# Patient Record
Sex: MALE | Race: BLACK OR AFRICAN AMERICAN
[De-identification: names, ages, dates, MRNs, and addresses within clinical notes are randomized per-mention and may not be internally consistent; named-entity substitution may affect disease eponyms.]

---

## 2017-09-22 NOTE — HP
COWS





- Scale


Resting Pulse: 0= OR 80 or Below


Sweatin=Flushed/Facial Moisture


Restless Observation: 1= Difficult to Sit Still


Pupil Size: 0= Normal to Room Light


Bone or Joint Aches: 2= Severe Diffuse Aches


Runny Nose/ Eye Tearin= Runny Nose/Eyes


GI Upset > 30mins: 0= None


Tremor Observation: 2= Slight Tremor Visible


Yawning Observation: 2= >3x During Session


Anxiety or Irritability: 2=Irritable/Anxious


Goose Flesh Skin: 3=Piloerection


COWS Score: 16





CIWA Score





- CIWA Score


Nausea/Vomitin-No Nausea/No Vomiting


Muscle Tremors: 4-Moderate,w/Arms Extend


Anxiety: 3


Agitation: 4-Moderately Restless


Paroxysmal Sweats: 3


Orientation: 0-Oriented


Tacttile Disturbances: 0-None


Auditory Disturbances: 0-None


Visual Disturbances: 0-None


Headache: 0-None Present


CIWA-Ar Total Score: 14





Admission ROS [facility]





- HPI


Chief Complaint: 


I need to get my life back. I want to go to rehab to continue my treatment. 


Allergies/Adverse Reactions: 


 Allergies











Allergy/AdvReac Type Severity Reaction Status Date / Time


 


No Known Allergies Allergy   Verified 17 15:07











History of Present Illness: 


pt is a 57yr old male with a history of alcohol and heroin dependence seeking 

detox for treatment. 


Exam Limitations: No Limitations





- Ebola screening


Have you traveled outside of the country in the last 21 days: No


Have you had contact with anyone from an Ebola affected area: No


Have you been sick,other than usual withdrawal symptoms: No


Do you have a fever: No





- Review of Systems


Constitutional: Chills, Diaphoresis, Loss of Appetite, Night Sweats, Changes in 

sleep


EENT: reports: No Symptoms Reported


Respiratory: reports: Cough


Cardiac: reports: No Symptoms Reported


GI: reports: Constipated, Poor Fluid Intake, Indigestion


: reports: No Symptoms Reported


Musculoskeletal: reports: Back Pain, Other (feet pain)


Integumentary: reports: Flushing, Sweating


Neuro: reports: Tingling, Tremors


Endocrine: reports: Excessive Sweating, Flushing, Intolerance to Cold, 

Intolerance to Heat


Hematology: reports: No Symptoms Reported


Psychiatric: reports: Judgement Intact, Mood/Affect Appropiate, Orientated x3, 

Agitated, Anxious


Other Systems: Reviewed and Negative





Patient History





- Patient Medical History


Hx Anemia: No


Hx Asthma: No


Hx Chronic Obstructive Pulmonary Disease (COPD): No


Hx Cancer: No


Hx Cardiac Disorders: No


Hx Congestive Heart Failure: No


Hx Hypertension: No


Hx Hypercholesterolemia: No


Hx Pacemaker: No


HX Cerebrovascular Accident: No


Hx Seizures: No


Hx Dementia: No


Hx Diabetes: No


Hx Gastrointestinal Disorders: No


Hx Liver Disease: No


Hx Genitourinary Disorders: No


Hx Sexually Transmitted Disorders: No


Hx Renal Disease (ESRD): No


Hx Thyroid Disease: No


Hx Human Immunodeficiency Virus (HIV): No (negative)


Hx Hepatitis C: Yes (since )


Hx Depression: Yes


Hx Suicide Attempt: No (denies)


Hx Bipolar Disorder: No


Hx Schizophrenia: No





- Patient Surgical History


Past Surgical History: Yes


Hx Neurologic Surgery: No


Hx Cataract Extraction: No


Hx Cardiac Surgery: No


Hx Lung Surgery: No


Hx Breast Surgery: No


Hx Breast Biopsy: No


Hx Abdominal Surgery: Yes (right inguinal hernia in )


Hx Appendectomy: No


Hx Cholecystectomy: No


Hx Genitourinary Surgery: No


Hx  Section: No


Hx Orthopedic Surgery: No


Other Surgical History: right inguinal hernia repaqir in 


Anesthesia Reaction: No





- PPD History


Previous Implant?: Yes


Documented Results: Negative w/o proof


PPD to be Administered?: Yes





- Reproductive History


Patient is a Female of Child Bearing Age (11 -55 yrs old): No





- Smoking Cessation


Smoking history: Current every day smoker


Have you smoked in the past 12 months: Yes


Aproximately how many cigarettes per day: 20


Hx Chewing Tobacco Use: No


Initiated information on smoking cessation: Yes


'Breaking Loose' booklet given: 17





- Substance & Tx. History


Hx Alcohol Use: Yes


Hx Substance Use: Yes


Substance Use Type: Alcohol, Heroin


Hx Substance Use Treatment: Yes (last detox at [facility] 2 weeks ago and signed 

out. )





- Substances Abused


  ** Alcohol


Route: Oral


Frequency: Daily


Amount used: vodka(2 pints)/beer-4-40oz


Age of first use: 10


Date of Last Use: 17





  ** Heroin


Route: Inhalation


Frequency: Daily


Amount used: 5-6 bags


Age of first use: 17


Date of Last Use: 17





  ** Cocaine


Route: Smoking


Frequency: 1-3 times last 30 days


Amount used: $10


Age of first use: 14


Date of Last Use: 17





  ** Marijuana/Hashish


Route: Smoking


Frequency: Daily


Amount used: $10


Age of first use: 8


Date of Last Use: 15





Family Disease History





- Family Disease History


Family History: Denies





Admission Physical Exam [facility]





- Vital Signs


Vital Signs: 


 Vital Signs - 24 hr











  17





  11:51


 


Temperature 97.3 F L


 


Pulse Rate 61


 


Respiratory 18





Rate 


 


Blood Pressure 125/62














- Physical


General Appearance: Yes: Appropriately Dressed, Moderate Distress, Tremorous, 

Irritable, Sweating, Anxious


HEENTM: Yes: Hearing grossly Normal, Normal Voice


Respiratory: Yes: Lungs Clear, Normal Breath Sounds, No Respiratory Distress


Neck: Yes: No masses,lesions,Nodules


Breast: Yes: Within Normal Limits


Cardiology: Yes: Regular Rhythm, Regular Rate, S1, S2


Abdominal: Yes: Normal Bowel Sounds, Non Tender, Soft


Genitourinary: Yes: Within Normal Limits


Back: Yes: Within Normal Limits


Musculoskeletal: Yes: full range of Motion, Back pain


Extremities: Yes: Normal Capillary Refill, Normal Inspection, Tremors


Neurological: Yes: Fully Oriented, Alert, Normal Response


Integumentary: Yes: Normal Color, Diaphoresis


Lymphatic: Yes: Within Normal Limits





- Diagnostic


(1) Alcohol dependence with uncomplicated withdrawal


Current Visit: Yes   Status: Chronic





(2) Opioid dependence with withdrawal


Current Visit: Yes   Status: Chronic





(3) Hepatitis C carrier


Current Visit: No   Status: Chronic





(4) Tinea pedis of both feet


Current Visit: Yes   Status: Chronic








Cleared for Admission [facility]





- Detox or Rehab


[facility] Level of Care: Medically Managed


Detox Regimen/Protocol: Methadone/Librium





[facility] Breath Alcohol Content


Breath Alcohol Content: 0





Urine Drug Screen





- Results


Drug Screen Negative: No


Urine Drug Screen Results: THC-Marijuana, JULIANNA-Cocaine, OPI-Opiates, PCP-

Phencyclidine, BZO-Benzodiazepines, MTD-Methadone

## 2017-09-23 NOTE — EKG
Test Reason : 

Blood Pressure : ***/*** mmHG

Vent. Rate : 056 BPM     Atrial Rate : 056 BPM

   P-R Int : 170 ms          QRS Dur : 088 ms

    QT Int : 436 ms       P-R-T Axes : 078 -37 022 degrees

   QTc Int : 420 ms

 

SINUS BRADYCARDIA

LEFT AXIS DEVIATION

ABNORMAL ECG

NO PREVIOUS ECGS AVAILABLE

Confirmed by MD JAVIER, SLICK ([id number]) on 9/23/2017 9:50:05 AM

 

Referred By:             Confirmed By:SLICK HERRERA MD

## 2017-09-23 NOTE — CONSULT
[facility] Psychiatric Consult





- Data


Date of interview: 09/23/17


Admission source: Self-referred


Identifying data: Mr Rosenbaum is a 57 years old single Black, father of 3 childre

, unemployed with no source of income, homeless seeking detox treatment for 

alcohol, heroin, cocaine and marijuana


Substance Abuse History: Reports history of alcohol, heroin, cocaine and 

marijuana use. He started  smoking marijuana at age 8, drinking at age 10, 

smoking crack cocaine at 14 and using heroin at 17. Consumes 2 pints of vodka & 

4x 40ox of beer, 5-6 bags of heroin and $10 worth of marijuana daily. reports 

smoking $10 worth of Crack cocaine 1-3 times in the last 30 days


Medical History: Significant for Hep C diagnosed in 1993 and history of surgery 

for right inguina hernia repair. Smokes cigarettes 1ppd


Psychiatric History: Denies history of previous psychiatric treatment


Physical/Sexual Abuse/Trauma History: Denies


Additional Comment: Reports history of multiple arrests including multiple 

felony convictions. Reports being on parole till 2019





Mental Status Exam





- Mental Status Exam


Alert and Oriented to: Time, Place, Person


Cognitive Function: Fair


Patient Appearance: Well Groomed


Mood: Depressed, Anxious


Affect: Appropriate


Patient Behavior: Cooperative


Speech Pattern: Clear


Voice Loudness: Normal


Thought Process: Intact


Thought Disorder: Not Present


Hallucinations: Denies


Suicidal Ideation: Denies


Homicidal Ideation: Denies


Insight/Judgement: Poor


Sleep: Poorly


Appetite: Poor


Muscle strength/Tone: Normal


Gait/Station: Normal





Psychiatric Findings





- Problem List (Axis 1, 2,3)


(1) Substance induced mood disorder


Current Visit: Yes   Status: Acute





(2) Substance-induced sleep disorder


Current Visit: Yes   Status: Acute





(3) Alcohol dependence with uncomplicated withdrawal


Current Visit: Yes   Status: Chronic





(4) Opioid dependence with withdrawal


Current Visit: Yes   Status: Chronic





(5) Cannabis dependence


Current Visit: Yes   Status: Acute





(6) Nicotine dependence


Current Visit: Yes   Status: Acute   





(7) Tinea pedis of both feet


Current Visit: Yes   Status: Chronic





(8) s/p right inguinal hernia repair


Current Visit: No   Status: Active





(9) tinea corporis of right hand


Current Visit: No   Status: Active





(10) Hepatitis C carrier


Current Visit: No   Status: Chronic








- Initial Treatment Plan


Initial Treatment Plan: 1) Start Ambien 10 mg po HS prn for insomnia. Benefits 

vs Risks of medication discussed with patient and he agreed to try it.  2) 

Continue inpatient detoxfication

## 2017-09-23 NOTE — DS
[facility] Detox Discharge Summary


Admission Date: 


09/22/17





Discharge Date: 09/23/17





- History


Present History: Alcohol Dependence, Cannabis Dependence, Opioid Dependence


Additional Comments: 


PATIENT DOES NOT WISH TO STAY TO COMPLETE DETOX REGIMEN. PATIENT ADVISED TO GO 

IMMEDIATELY TO NEAREST ER SHOULD ANY INTOLERABLE DETOX SYMPTOMS DEVELOP AT ANY 

TIME. PATIENT LEFT DETOX UNIT IN STABLE MEDICAL CONDITION.


Pertinent Past History: 


Hep C, History of Inguinal Hernia Repair, Tinea Pedis of Bilateral Feet.





- Physical Exam Results


Vital Signs: 


 Vital Signs











Temperature  96.7 F L  09/23/17 09:42


 


Pulse Rate  56 L  09/23/17 09:42


 


Respiratory Rate  18   09/23/17 09:42


 


Blood Pressure  125/86   09/23/17 09:42


 


O2 Sat by Pulse Oximetry (%)      











Pertinent Admission Physical Exam Findings: 


WITHDRAWAL SYMPTOMS.





 Laboratory Tests











  09/22/17 09/22/17 09/22/17





  15:00 15:00 15:00


 


WBC  6.0  


 


RBC  4.96  


 


Hgb  13.9  D  


 


Hct  42.3  


 


MCV  85.3  


 


MCH  28.1  


 


MCHC  32.9  


 


RDW  14.7  


 


Plt Count  272  D  


 


MPV  10.1  


 


Platelet Comment  No clumping noted  


 


Sodium   140 


 


Potassium   4.5 


 


Chloride   105 


 


Carbon Dioxide   26 


 


Anion Gap   9 


 


BUN   18  D 


 


Creatinine   1.0 


 


Creat Clearance w eGFR   > 60 


 


POC Glucometer   


 


Random Glucose   80 


 


Calcium   9.3 


 


Total Bilirubin   0.7 


 


AST   76 H D 


 


ALT   92 H 


 


Alkaline Phosphatase   50  D 


 


Total Protein   7.2 


 


Albumin   3.7 


 


Urine Color   


 


Urine Appearance   


 


Urine pH   


 


Ur Specific Gravity   


 


Urine Protein   


 


Urine Glucose (UA)   


 


Urine Ketones   


 


Urine Blood   


 


Urine Nitrite   


 


Urine Bilirubin   


 


Urine Urobilinogen   


 


Urine RBC   


 


Urine WBC   


 


Hyaline Casts   


 


Urine Mucus   


 


RPR Titer    Nonreactive














  09/22/17 09/22/17 09/23/17





  15:25 21:46 06:06


 


WBC   


 


RBC   


 


Hgb   


 


Hct   


 


MCV   


 


MCH   


 


MCHC   


 


RDW   


 


Plt Count   


 


MPV   


 


Platelet Comment   


 


Sodium   


 


Potassium   


 


Chloride   


 


Carbon Dioxide   


 


Anion Gap   


 


BUN   


 


Creatinine   


 


Creat Clearance w eGFR   


 


POC Glucometer  163   101


 


Random Glucose   


 


Calcium   


 


Total Bilirubin   


 


AST   


 


ALT   


 


Alkaline Phosphatase   


 


Total Protein   


 


Albumin   


 


Urine Color   Lt. yellow 


 


Urine Appearance   Clear 


 


Urine pH   5.0 


 


Ur Specific Gravity   1.025 


 


Urine Protein   Negative 


 


Urine Glucose (UA)   Negative 


 


Urine Ketones   Negative 


 


Urine Blood   1+ H 


 


Urine Nitrite   Negative 


 


Urine Bilirubin   Negative 


 


Urine Urobilinogen   0.2 


 


Urine RBC   <1 


 


Urine WBC   1 


 


Hyaline Casts   22 


 


Urine Mucus   Rare 


 


RPR Titer   








LABS NOTED.





- Treatment


Hospital Course: Detoxed Safely





- Medication


Discharge Medications: 


Ambulatory Orders





Metformin HCl [Glucophage -] 500 mg PO DAILY 09/22/17 











- Diagnosis


(1) Cannabis dependence


Status: Acute





(2) Nicotine dependence


Status: Chronic   Qualifiers: 


     Nicotine product type: cigarettes     Substance use status: uncomplicated 

       Qualified Code(s): F17.210 - Nicotine dependence, cigarettes, 

uncomplicated  





(3) Substance induced mood disorder


Status: Acute





(4) Substance-induced sleep disorder


Status: Acute





(5) Opioid dependence with withdrawal


Status: Acute





(6) Tinea pedis of both feet


Status: Chronic





(7) s/p right inguinal hernia repair


Status: Chronic





(8) tinea corporis of right hand


Status: Active





(9) Hepatitis C carrier


Status: Chronic





(10) Alcohol dependence with uncomplicated withdrawal


Status: Acute








- AMA


Did Patient Leave Against Medical Advice: Yes (PATIENT DID NOT WEISH TO STAY TO 

COMPLETE DETOX REGIMEN.)

## 2018-09-08 NOTE — HP
COWS





- Scale


Resting Pulse: 0= AL 80 or Below


Sweatin= Chills/Flushing


Restless Observation: 1= Difficult to Sit Still


Pupil Size: 0= Normal to Room Light


Bone or Joint Aches: 1= Mild Discomfort


Runny Nose/ Eye Tearin= Nasal Congestion


GI Upset > 30mins: 2= Nausea/Diarrhea


Tremor Observation: 2= Slight Tremor Visible


Yawning Observation: 1= 1-2x During Session


Anxiety or Irritability: 1=Feels Anxious/Irritable


Goose Flesh Skin: 0=Smooth Skin


COWS Score: 10





CIWA Score





- CIWA Score


Nausea/Vomitin-Mild Nausea/No Vomiting


Muscle Tremors: 3


Anxiety: 4-Mod. Anxious/Guarded


Agitation: 1-Slight > Activity


Paroxysmal Sweats: No Perspiration


Orientation: 1-Uncertain about Date


Tacttile Disturbances: 0-None


Auditory Disturbances: 1-Very Mild


Visual Disturbances: 1-Very Mild Sensitivity


Headache: 2-Mild


CIWA-Ar Total Score: 14





Admission ROS [facility]





- HPI


Chief Complaint: 


I got to stop, I'm tired


Allergies/Adverse Reactions: 


 Allergies











Allergy/AdvReac Type Severity Reaction Status Date / Time


 


No Known Allergies Allergy   Verified 18 11:31











History of Present Illness: 


57 yo gentleman here for detox from alcohol, opiates - also using cocaine.  

This is one of multiple admissions for treatment, last time here 17.  

History of one overdose and also black outs.  Denies seizures. 


Exam Limitations: Clinical Condition





- Ebola screening


Have you traveled outside of the country in the last 21 days: No (N)


Have you had contact with anyone from an Ebola affected area: No


Have you been sick,other than usual withdrawal symptoms: No


Do you have a fever: No





- Review of Systems


Constitutional: Loss of Appetite, Changes in sleep, Weakness


EENT: reports: Blurred Vision, Nose Congestion


Respiratory: reports: Cough (due to smoking)


Cardiac: reports: No Symptoms Reported


GI: reports: Nausea, Poor Appetite, Indigestion


: reports: Frequency


Musculoskeletal: reports: Back Pain, Joint Pain, Muscle Pain


Integumentary: reports: Lesions (right pinky toe painful corn)


Neuro: reports: Headache, Tremors


Endocrine: reports: No Symptoms Reported


Hematology: reports: No Symptoms Reported


Psychiatric: reports: Judgement Intact, Mood/Affect Appropiate, Anxious


Other Systems: Reviewed and Negative





Patient History





- Patient Medical History


Hx Anemia: No


Hx Asthma: No


Hx Chronic Obstructive Pulmonary Disease (COPD): No


Hx Cancer: No


Hx Cardiac Disorders: No


Hx Congestive Heart Failure: No


Hx Hypertension: No


Hx Hypercholesterolemia: No


Hx Pacemaker: No


HX Cerebrovascular Accident: No


Hx Seizures: No


Hx Dementia: No


Hx Diabetes: Yes (pre diabetes)


Hx Gastrointestinal Disorders: No


Hx Liver Disease: No


Hx Genitourinary Disorders: No


Hx Sexually Transmitted Disorders: No


Hx Renal Disease (ESRD): No


Hx Thyroid Disease: No


Hx Human Immunodeficiency Virus (HIV): No (negative)


Hx Hepatitis C: Yes (since  - never treated)


Hx Depression: Yes (no meds)


Hx Suicide Attempt: No (denies (everday I use drugs it's like suicide))


Hx Bipolar Disorder: No


Hx Schizophrenia: No





- Patient Surgical History


Past Surgical History: Yes


Hx Neurologic Surgery: No


Hx Cataract Extraction: No


Hx Cardiac Surgery: No


Hx Lung Surgery: No


Hx Breast Surgery: No


Hx Breast Biopsy: No


Hx Abdominal Surgery: Yes (right inguinal hernia in )


Hx Appendectomy: No


Hx Cholecystectomy: No


Hx Genitourinary Surgery: No


Hx  Section: No


Hx Orthopedic Surgery: No


Anesthesia Reaction: No





- PPD History


Date: 15





- Smoking Cessation


Smoking history: Current every day smoker


Have you smoked in the past 12 months: Yes


Aproximately how many cigarettes per day: 20


Hx Chewing Tobacco Use: No


Initiated information on smoking cessation: Yes


'Breaking Loose' booklet given: 18 (give on floor)





- Substance & Tx. History


Hx Alcohol Use: Yes


Hx Substance Use: Yes


Substance Use Type: Alcohol, Cocaine, Heroin, Marijuana


Hx Substance Use Treatment: Yes (detox, rehab, 1995 in Methadone program, 

suboxone in intermediate)





- Substances Abused


  ** alcohol


Route: Oral


Frequency: Daily


Amount used: five 16 oz beer; 1.5 pints liquor


Age of first use: 14


Date of Last Use: 16





  ** heroin


Route: Inhalation


Frequency: Daily


Amount used: 6 bags


Age of first use: 14


Date of Last Use: 16





  ** cocaine


Route: Smoking


Frequency: Daily


Amount used: $20


Age of first use: 56


Date of Last Use: 16





  ** pot


Route: Smoking


Frequency: Daily


Amount used: 1 bag


Age of first use: 10


Date of Last Use: 16





Family Disease History





- Family Disease History


Family Disease History: [place]: Mother (, ), Other: Father (no contact), 

Mother, Brother (three - living - no contact), Sister (two - living - ), Son (

two - living ), Daughter (one - living)





Admission Physical Exam [facility]





- Vital Signs


Vital Signs: 


 Vital Signs - 24 hr











  18





  09:52


 


Temperature 96.4 F L


 


Pulse Rate 51 L


 


Respiratory 18





Rate 


 


Blood Pressure 123/75














- Physical


General Appearance: Yes: Nourished, Appropriately Dressed, Moderate Distress, 

Tremorous, Anxious


HEENTM: Yes: Hearing grossly Normal, Normocephalic, Normal Voice, Pharynx Normal


Respiratory: Yes: No Respiratory Distress, Rhonchi


Neck: Yes: No masses,lesions,Nodules, Supple


Breast: Yes: Breast Exam Deferred


Cardiology: Yes: Regular Rate, Bradycardia


Abdominal: Yes: Flat, Soft


Genitourinary: Yes: Frequency


Back: Yes: Normal Inspection


Musculoskeletal: Yes: full range of Motion, Gait Steady


Extremities: Yes: Normal Inspection, Normal Range of Motion, Non-Tender


Neurological: Yes: Fully Oriented, Alert, Motor Strength 5/5, Normal Mood/Affect

, Normal Response


Integumentary: Yes: Normal Color, Warm, Track Marks (old track marks - states 

he now sniffs), Other (right pinky toe with hardened tender corn)


Lymphatic: Yes: Within Normal Limits





- Addiitonal


Findings: 


bgm=[id number]





- Diagnostic


(1) Opioid dependence with withdrawal


Current Visit: Yes   Status: Chronic   





(2) Alcohol dependence with uncomplicated withdrawal


Current Visit: Yes   Status: Chronic   





(3) marijuana dependence


Current Visit: Yes   Status: Acute   





(4) Corn of toe


Current Visit: Yes   Status: Chronic   





(5) History of prediabetes


Current Visit: Yes   Status: Chronic   





(6) Hepatitis C carrier


Current Visit: Yes   Status: Chronic   





(7) Nicotine dependence


Current Visit: Yes   Status: Chronic   


Qualifiers: 


   Nicotine product type: cigarettes   Substance use status: uncomplicated   

Qualified Code(s): F17.210 - Nicotine dependence, cigarettes, uncomplicated   





Cleared for Admission [facility]





- Detox or Rehab


[facility] Level of Care: Medically Managed


Detox Regimen/Protocol: Methadone/Librium





[facility] Breath Alcohol Content


Breath Alcohol Content: 0.084





Urine Drug Screen





- Results


Drug Screen Negative: No


Urine Drug Screen Results: THC-Marijuana, JULIANNA-Cocaine, OPI-Opiates

## 2018-09-09 NOTE — CONSULT
[facility] Psychiatric Consult





- Data


Date of interview: 09/09/18


Admission source: Self-referred


Identifying data: Mr Rosenbaum is a 58 years old single Black male,  father of 3 

children, unemployed on SSI, homeless seeking detox treatment for alcohol opioid

, cocaine and cannabis


Substance Abuse History: Reports history of alcohol, heroin, cocaine and 

marijuana use. Refer to addiction counselor's summary for further information


Medical History: Significant for hepatitis  C diagnosed in 1993 and history of 

surgery for right inguinal hernia repair. Smokes cigarettes 1ppd


Psychiatric History: Denies history of previous psychiatric treatment


Physical/Sexual Abuse/Trauma History: Denies history of emotional, physical or 

sexual abuse


Additional Comment: Reports history of multiple arrests including multiple 

felony convictions. Reports being on parole till 2019





Mental Status Exam





- Mental Status Exam


Alert and Oriented to: Time, Place, Person


Cognitive Function: Fair


Patient Appearance: Well Groomed


Mood: Irritable


Affect: Appropriate


Patient Behavior: Sedated, Cooperative (superficially)


Speech Pattern: Clear


Voice Loudness: Normal


Thought Process: Intact


Thought Disorder: Not Present


Hallucinations: Denies


Suicidal Ideation: Denies


Homicidal Ideation: Denies


Insight/Judgement: Poor


Sleep: Poorly


Appetite: Good


Muscle strength/Tone: Normal


Gait/Station: Normal





Psychiatric Findings





- Problem List (Axis 1, 2,3)


(1) Substance induced mood disorder


Current Visit: Yes   Status: Acute   





(2) Substance-induced sleep disorder


Current Visit: Yes   Status: Acute   





(3) Alcohol dependence with uncomplicated withdrawal


Current Visit: Yes   Status: Chronic   





(4) Opioid dependence with withdrawal


Current Visit: Yes   Status: Chronic   





(5) Cocaine dependence


Current Visit: Yes   Status: Acute   





(6) Cannabis dependence


Current Visit: No   Status: Acute   





(7) Nicotine dependence


Current Visit: Yes   Status: Chronic   


Qualifiers: 


   Nicotine product type: cigarettes   Substance use status: uncomplicated   

Qualified Code(s): F17.210 - Nicotine dependence, cigarettes, uncomplicated   





(8) Hepatitis C carrier


Current Visit: Yes   Status: Chronic   





(9) History of prediabetes


Current Visit: Yes   Status: Chronic   





- Initial Treatment Plan


Initial Treatment Plan: 1) Start Ambien 10 mg po HS prn for insomnia.  2) 

Continue inpatient detoxification

## 2018-09-09 NOTE — PN
S CIWA





- CIWA Score


Nausea/Vomitin-Mild Nausea/No Vomiting


Muscle Tremors: 4-Moderate,w/Arms Extend


Anxiety: 4-Mod. Anxious/Guarded


Agitation: 3


Paroxysmal Sweats: 1-Minimal Palms Moist


Orientation: 0-Oriented


Tacttile Disturbances: 0-None


Auditory Disturbances: 0-None


Visual Disturbances: 0-None


Headache: 1-Very Mild


CIWA-Ar Total Score: 14





BHS COWS





- Scale


Resting Pulse: 0= [place] 80 or Below


Sweatin= Chills/Flushing


Restless Observation: 1= Difficult to Sit Still


Pupil Size: 0= Normal to Room Light


Bone or Joint Aches: 2= Severe Diffuse Aches


Runny Nose/ Eye Tearin= Nasal Congestion


GI Upset > 30mins: 2= Nausea/Diarrhea


Tremor Observation of Outstretched Hands: 2= Slight Tremor Visible


Yawning Observation: 2= >3x During Session


Anxiety or Irritability: 2=Irritable/Anxious


Goose Flesh Skin: 0=Smooth Skin


COWS Score: 13





BHS Progress Note (SOAP)


Subjective: 





anxiety tremor sweat low energy trouble sleep at night


Objective: 





18 15:15


 Vital Signs











Temperature  97.3 F L  18 14:53


 


Pulse Rate  58 L  18 14:53


 


Respiratory Rate  18   18 14:53


 


Blood Pressure  144/93   18 14:53


 


O2 Sat by Pulse Oximetry (%)      








 Laboratory Last Values











WBC  6.6 K/mm3 (4.0-10.0)   18  08:00    


 


RBC  5.33 M/mm3 (4.00-5.60)   18  08:00    


 


Hgb  14.7 GM/dL (11.7-16.9)   18  08:00    


 


Hct  45.0 % (35.4-49)   18  08:00    


 


MCV  84.4 fl (80-96)   18  08:00    


 


MCH  27.6 pg (25.7-33.7)   18  08:00    


 


MCHC  32.7 g/dl (32.0-35.9)   18  08:00    


 


RDW  15.1 % (11.9-15.9)   18  08:00    


 


Plt Count  229 K/MM3 (134-434)   18  08:00    


 


MPV  9.7 fl (7.5-11.1)   18  08:00    


 


Sodium  141 mmol/L (136-145)   18  08:00    


 


Potassium  4.0 mmol/L (3.5-5.1)   18  08:00    


 


Chloride  106 mmol/L ()   18  08:00    


 


Carbon Dioxide  26 mmol/L (21-32)   18  08:00    


 


Anion Gap  9 MMOL/L (8-16)   18  08:00    


 


BUN  10 mg/dL (7-18)   18  08:00    


 


Creatinine  0.7 mg/dL (0.7-1.3)   18  08:00    


 


Creat Clearance w eGFR  > 60  (>60)   18  08:00    


 


POC Glucometer  135 UNITS ()   18  11:27    


 


Random Glucose  102 mg/dL ()  D 18  08:00    


 


Calcium  8.8 mg/dL (8.5-10.1)   18  08:00    


 


Total Bilirubin  0.5 mg/dL (0.2-1.0)   18  08:00    


 


AST  35 U/L (15-37)  D 18  08:00    


 


ALT  33 U/L (12-78)  D 18  08:00    


 


Alkaline Phosphatase  53 U/L ()   18  08:00    


 


Total Protein  6.8 g/dl (6.4-8.2)   18  08:00    


 


Albumin  3.2 g/dl (3.4-5.0)  L  18  08:00    


 


Urine Color  Yellow   18  14:02    


 


Urine Appearance  Turbid   18  14:02    


 


Urine pH  5.0  (5.0-8.0)   18  14:02    


 


Ur Specific Gravity  1.023  (1.001-1.035)   18  14:02    


 


Urine Protein  Negative  (NEGATIVE)   18  14:02    


 


Urine Glucose (UA)  Negative  (NEGATIVE)   18  14:02    


 


Urine Ketones  Negative  (NEGATIVE)   18  14:02    


 


Urine Blood  1+  (NEGATIVE)  H  18  14:02    


 


Urine Nitrite  Negative  (NEGATIVE)   18  14:02    


 


Urine Bilirubin  Negative  (<2.0 mg/dL)   18  14:02    


 


Urine Urobilinogen  Negative mg/dL (0.2-1.0)   18  14:02    


 


Ur Leukocyte Esterase  Negative  (NEGATIVE)   18  14:02    


 


Urine WBC (Auto)  47 /hpf (3-5)   18  14:02    


 


Urine RBC (Auto)  17 /hpf (0-3)   18  14:02    


 


Urine Bacteria  Many /hpf (NONE SEEN)   18  14:02    


 


RPR Titer  Nonreactive  (NONREACTIVE)   18  08:00    


 


HIV 1&2 Antibody Screen  Negative   18  08:00    


 


HIV P24 Antigen  Negative   18  08:00    








lab noted


Assessment: 





18 15:16


withdrawal sx


Plan: 





continue detox

## 2018-09-10 NOTE — EKG
Test Reason : 

Blood Pressure : ***/*** mmHG

Vent. Rate : 050 BPM     Atrial Rate : 050 BPM

   P-R Int : 154 ms          QRS Dur : 106 ms

    QT Int : 422 ms       P-R-T Axes : 046 -45 022 degrees

   QTc Int : 384 ms

 

SINUS BRADYCARDIA

LEFT ANTERIOR FASCICULAR BLOCK

CANNOT RULE OUT INFERIOR INFARCT (MASKED BY FASCICULAR BLOCK?) , AGE

UNDETERMINED

ABNORMAL ECG

WHEN COMPARED WITH ECG OF 22-SEP-2017 18:56,

NO SIGNIFICANT CHANGE WAS FOUND

Confirmed by MILENA TREJO MD ([id number]) on 9/10/2018 11:24:42 AM

 

Referred By:             Confirmed By:MILENA TREJO MD

## 2018-09-10 NOTE — DS
[facility]HS Detox Discharge Summary


Admission Date: 


09/08/18





Discharge Date: 09/10/18





- History


Present History: Alcohol Dependence, Opioid Dependence


Additional Comments: 





58 years old male admitted on 9/8/18 for alcohol and opiate withdrawal sx


insists to terminate detox regimen "whole things are not right"


encourage express feelings and concerns


patient refuses to discuss further


recommend community self help groups and management toward sobriety


Pertinent Past History: 





patient reported chronic uti but will consider antibiotic 


oral fluid








- Physical Exam Results


Vital Signs: 


 Vital Signs











Temperature  97.5 F L  09/10/18 06:46


 


Pulse Rate  51 L  09/10/18 06:46


 


Respiratory Rate  16   09/10/18 06:46


 


Blood Pressure  141/77   09/10/18 06:46


 


O2 Sat by Pulse Oximetry (%)      











Pertinent Admission Physical Exam Findings: 





alcohol and opiate withdrawal sx


 Vital Signs











Temperature  97.5 F L  09/10/18 06:46


 


Pulse Rate  51 L  09/10/18 06:46


 


Respiratory Rate  16   09/10/18 06:46


 


Blood Pressure  141/77   09/10/18 06:46


 


O2 Sat by Pulse Oximetry (%)      








 Laboratory Last Values











WBC  6.6 K/mm3 (4.0-10.0)   09/09/18  08:00    


 


RBC  5.33 M/mm3 (4.00-5.60)   09/09/18  08:00    


 


Hgb  14.7 GM/dL (11.7-16.9)   09/09/18  08:00    


 


Hct  45.0 % (35.4-49)   09/09/18  08:00    


 


MCV  84.4 fl (80-96)   09/09/18  08:00    


 


MCH  27.6 pg (25.7-33.7)   09/09/18  08:00    


 


MCHC  32.7 g/dl (32.0-35.9)   09/09/18  08:00    


 


RDW  15.1 % (11.9-15.9)   09/09/18  08:00    


 


Plt Count  229 K/MM3 (134-434)   09/09/18  08:00    


 


MPV  9.7 fl (7.5-11.1)   09/09/18  08:00    


 


Sodium  141 mmol/L (136-145)   09/09/18  08:00    


 


Potassium  4.0 mmol/L (3.5-5.1)   09/09/18  08:00    


 


Chloride  106 mmol/L ()   09/09/18  08:00    


 


Carbon Dioxide  26 mmol/L (21-32)   09/09/18  08:00    


 


Anion Gap  9 MMOL/L (8-16)   09/09/18  08:00    


 


BUN  10 mg/dL (7-18)   09/09/18  08:00    


 


Creatinine  0.7 mg/dL (0.7-1.3)   09/09/18  08:00    


 


Creat Clearance w eGFR  > 60  (>60)   09/09/18  08:00    


 


POC Glucometer  128 UNITS ()   09/10/18  05:26    


 


Random Glucose  102 mg/dL ()  D 09/09/18  08:00    


 


Calcium  8.8 mg/dL (8.5-10.1)   09/09/18  08:00    


 


Total Bilirubin  0.5 mg/dL (0.2-1.0)   09/09/18  08:00    


 


AST  35 U/L (15-37)  D 09/09/18  08:00    


 


ALT  33 U/L (12-78)  D 09/09/18  08:00    


 


Alkaline Phosphatase  53 U/L ()   09/09/18  08:00    


 


Total Protein  6.8 g/dl (6.4-8.2)   09/09/18  08:00    


 


Albumin  3.2 g/dl (3.4-5.0)  L  09/09/18  08:00    


 


Urine Color  Yellow   09/08/18  14:02    


 


Urine Appearance  Turbid   09/08/18  14:02    


 


Urine pH  5.0  (5.0-8.0)   09/08/18  14:02    


 


Ur Specific Gravity  1.023  (1.001-1.035)   09/08/18  14:02    


 


Urine Protein  Negative  (NEGATIVE)   09/08/18  14:02    


 


Urine Glucose (UA)  Negative  (NEGATIVE)   09/08/18  14:02    


 


Urine Ketones  Negative  (NEGATIVE)   09/08/18  14:02    


 


Urine Blood  1+  (NEGATIVE)  H  09/08/18  14:02    


 


Urine Nitrite  Negative  (NEGATIVE)   09/08/18  14:02    


 


Urine Bilirubin  Negative  (<2.0 mg/dL)   09/08/18  14:02    


 


Urine Urobilinogen  Negative mg/dL (0.2-1.0)   09/08/18  14:02    


 


Ur Leukocyte Esterase  Negative  (NEGATIVE)   09/08/18  14:02    


 


Urine WBC (Auto)  47 /hpf (3-5)   09/08/18  14:02    


 


Urine RBC (Auto)  17 /hpf (0-3)   09/08/18  14:02    


 


Urine Bacteria  Many /hpf (NONE SEEN)   09/08/18  14:02    


 


RPR Titer  Nonreactive  (NONREACTIVE)   09/09/18  08:00    


 


HIV 1&2 Antibody Screen  Negative   09/09/18  08:00    


 


HIV P24 Antigen  Negative   09/09/18  08:00    








lab noted


reenforce hematuria uti 





- Treatment


Hospital Course: Detox Protocol Followed, Responded well


Patient has Accepted a Rehab Referral to: as per counselor arranged





- Medication


Discharge Medications: 


Ambulatory Orders





Sulfamethoxazole/Trimethoprim [Bactrim Ds Tablet] 1 each PO BID #10 tablet 09/10

/18 











- Diagnosis


(1) Substance induced mood disorder


Current Visit: Yes   Status: Suspected   





(2) Alcohol dependence with uncomplicated withdrawal


Current Visit: Yes   Status: Acute   





(3) Hepatitis C carrier


Current Visit: Yes   Status: Chronic   





(4) Opioid dependence with withdrawal


Current Visit: Yes   Status: Acute   





(5) Chronic UTI (urinary tract infection)


Current Visit: Yes   Status: Chronic   





- AMA


Did Patient Leave Against Medical Advice: Yes

## 2019-06-10 ENCOUNTER — HOSPITAL ENCOUNTER (INPATIENT)
Dept: HOSPITAL 74 - YASAS | Age: 59
LOS: 2 days | Discharge: LEFT BEFORE DISCHARGE | DRG: 770 | End: 2019-06-12
Attending: SURGERY | Admitting: SURGERY
Payer: COMMERCIAL

## 2019-06-10 VITALS — BODY MASS INDEX: 29.3 KG/M2

## 2019-06-10 DIAGNOSIS — F10.230: Primary | ICD-10-CM

## 2019-06-10 DIAGNOSIS — Z91.14: ICD-10-CM

## 2019-06-10 DIAGNOSIS — F32.9: ICD-10-CM

## 2019-06-10 DIAGNOSIS — R00.1: ICD-10-CM

## 2019-06-10 DIAGNOSIS — R55: ICD-10-CM

## 2019-06-10 DIAGNOSIS — F11.23: ICD-10-CM

## 2019-06-10 DIAGNOSIS — B35.3: ICD-10-CM

## 2019-06-10 DIAGNOSIS — G62.9: ICD-10-CM

## 2019-06-10 DIAGNOSIS — Z98.890: ICD-10-CM

## 2019-06-10 DIAGNOSIS — I10: ICD-10-CM

## 2019-06-10 DIAGNOSIS — B18.2: ICD-10-CM

## 2019-06-10 LAB
ALBUMIN SERPL-MCNC: 3.6 G/DL (ref 3.4–5)
ALP SERPL-CCNC: 73 U/L (ref 45–117)
ALT SERPL-CCNC: 81 U/L (ref 13–61)
ANION GAP SERPL CALC-SCNC: 5 MMOL/L (ref 8–16)
APPEARANCE UR: CLEAR
AST SERPL-CCNC: 64 U/L (ref 15–37)
BILIRUB SERPL-MCNC: 0.3 MG/DL (ref 0.2–1)
BILIRUB UR STRIP.AUTO-MCNC: NEGATIVE MG/DL
BUN SERPL-MCNC: 20.8 MG/DL (ref 7–18)
CALCIUM SERPL-MCNC: 8.9 MG/DL (ref 8.5–10.1)
CHLORIDE SERPL-SCNC: 104 MMOL/L (ref 98–107)
CO2 SERPL-SCNC: 28 MMOL/L (ref 21–32)
COLOR UR: YELLOW
CREAT SERPL-MCNC: 1.1 MG/DL (ref 0.55–1.3)
DEPRECATED RDW RBC AUTO: 14.7 % (ref 11.9–15.9)
GLUCOSE SERPL-MCNC: 128 MG/DL (ref 74–106)
HCT VFR BLD CALC: 41.8 % (ref 35.4–49)
HGB BLD-MCNC: 13.9 GM/DL (ref 11.7–16.9)
KETONES UR QL STRIP: NEGATIVE
LEUKOCYTE ESTERASE UR QL STRIP.AUTO: NEGATIVE
MCH RBC QN AUTO: 28.9 PG (ref 25.7–33.7)
MCHC RBC AUTO-ENTMCNC: 33.1 G/DL (ref 32–35.9)
MCV RBC: 87.2 FL (ref 80–96)
NITRITE UR QL STRIP: NEGATIVE
PH UR: 5 [PH] (ref 5–8)
PLATELET # BLD AUTO: 161 K/MM3 (ref 134–434)
PMV BLD: 10 FL (ref 7.5–11.1)
POTASSIUM SERPLBLD-SCNC: 4.1 MMOL/L (ref 3.5–5.1)
PROT SERPL-MCNC: 7.2 G/DL (ref 6.4–8.2)
PROT UR QL STRIP: NEGATIVE
PROT UR QL STRIP: NEGATIVE
RBC # BLD AUTO: 4.8 M/MM3 (ref 4–5.6)
SODIUM SERPL-SCNC: 138 MMOL/L (ref 136–145)
SP GR UR: 1.02 (ref 1.01–1.03)
UROBILINOGEN UR STRIP-MCNC: 0.2 MG/DL (ref 0.2–1)
WBC # BLD AUTO: 5.2 K/MM3 (ref 4–10)

## 2019-06-10 PROCEDURE — HZ2ZZZZ DETOXIFICATION SERVICES FOR SUBSTANCE ABUSE TREATMENT: ICD-10-PCS | Performed by: SURGERY

## 2019-06-10 RX ADMIN — GABAPENTIN SCH MG: 100 CAPSULE ORAL at 21:59

## 2019-06-10 RX ADMIN — NICOTINE SCH MG: 21 PATCH TRANSDERMAL at 11:37

## 2019-06-10 RX ADMIN — Medication SCH MG: at 21:59

## 2019-06-10 RX ADMIN — GABAPENTIN SCH MG: 100 CAPSULE ORAL at 15:20

## 2019-06-10 NOTE — HP
COWS





- Scale


Resting Pulse: 0= [place] 80 or Below


Sweatin= Chills/Flushing


Restless Observation: 1= Difficult to Sit Still


Pupil Size: 1= Pupils >than Normal


Bone or Joint Aches: 2= Severe Diffuse Aches


Runny Nose/ Eye Tearin= Runny Nose/Eyes


GI Upset > 30mins: 3= Vomiting/Diarrhea


Tremor Observation: 2= Slight Tremor Visible


Yawning Observation: 1= 1-2x During Session


Anxiety or Irritability: 2=Irritable/Anxious


Goose Flesh Skin: 0=Smooth Skin


COWS Score: 15





CIWA Score


Nausea/Vomitin


Muscle Tremors: 2


Anxiety: 2


Agitation: 2


Paroxysmal Sweats: 1-Minimal Palms Moist


Orientation: 0-Oriented


Tacttile Disturbances: 1-Very Mild Itch/Numbness


Auditory Disturbances: 1-Very Mild


Visual Disturbances: 0-None


Headache: 2-Mild


CIWA-Ar Total Score: 13





- Admission Criteria


[facility] Guidelines: Admission for Medically Managed Detox: 


Requires at least one of the followin. CIWA greater than 12


2. Seizures within the past 24 hours


3. Delirium tremens within the past 24 hours


4. Hallucinations within the past 24 hours


5. Acute intervention needed for co  occurring medical disorder


6. Acute intervention needed for co  occurring psychiatric disorder


7. Severe withdrawal that cannot be handled at a lower level of care (continued


    vomiting, continued diarrhea, abnormal vital signs) requiring intravenous


    medication and/or fluids


8. Pregnancy








Admission ROS [facility]





- [facility]


Chief Complaint: 





i need help to stop using heroin and alcohol


Allergies/Adverse Reactions: 


 Allergies











Allergy/AdvReac Type Severity Reaction Status Date / Time


 


No Known Allergies Allergy   Verified 06/10/19 09:12











History of Present Illness: 





this 58 years old male with heroin and alcohol dependence,seeking detox,

withdrawal symptom


last detox 18 to 09/10/18 not completed


hepatitis c follow up with pmd


multiple admissions in detox but history of non compliance,indicate he is ready 

to complete detox this time


for outpatient program after detox


nicotine dependence 1 pack,would like to have nicotine patch and gum


no significant period of sobriety


history of neuropathy








Exam Limitations: No Limitations





- Ebola screening


Have you traveled outside of the country in the last 21 days: No (N)


Have you had contact with anyone from an Ebola affected area: No


Do you have a fever: No





- Review of Systems


Constitutional: Chills, Loss of Appetite, Malaise, Night Sweats, Changes in 

sleep, Weakness, Unintentional Wgt. Loss


EENT: reports: Tearing, Nose Congestion


Respiratory: reports: No Symptoms reported


Cardiac: reports: No Symptoms Reported


GI: reports: Diarrhea, Nausea, Vomiting, Abdominal cramping


: reports: No Symptoms Reported


Musculoskeletal: reports: Back Pain, Muscle Pain


Integumentary: reports: Dryness


Neuro: reports: Headache, Tremors


Endocrine: reports: No Symptoms Reported, Other (prediabetes)


Hematology: reports: No Symptoms Reported


Psychiatric: reports: No Sypmtoms Reported, Judgement Intact, Mood/Affect 

Appropiate, Orientated x3, other


Other Systems: Reviewed and Negative





Patient History





- Patient Medical History


Hx Anemia: No


Hx Asthma: No


Hx Chronic Obstructive Pulmonary Disease (COPD): No


Hx Cancer: No


Hx Cardiac Disorders: No


Hx Congestive Heart Failure: No


Hx Hypertension: No


Hx Hypercholesterolemia: No


Hx Pacemaker: No


HX Cerebrovascular Accident: No


Hx Seizures: No


Hx Dementia: No


Hx Diabetes: Yes (pre diabetes)


Hx Gastrointestinal Disorders: No


Hx Liver Disease: No


Hx Genitourinary Disorders: No


Hx Sexually Transmitted Disorders: No


Hx Renal Disease (ESRD): No


Hx Thyroid Disease: No


Hx Human Immunodeficiency Virus (HIV): No (negative last )


Hx Hepatitis C: Yes (since  - never treated)


Hx Depression: Yes (no meds)


Hx Suicide Attempt: No (denies (everday I use drugs it's like suicide))


Hx Bipolar Disorder: No


Hx Schizophrenia: No


Other Medical History: no suicidal,no homicidal





- Patient Surgical History


Past Surgical History: Yes


Hx Neurologic Surgery: No


Hx Cataract Extraction: No


Hx Cardiac Surgery: No


Hx Lung Surgery: No


Hx Breast Surgery: No


Hx Breast Biopsy: No


Hx Abdominal Surgery: Yes (right inguinal hernia in )


Hx Appendectomy: No


Hx Cholecystectomy: No


Hx Genitourinary Surgery: No


Hx  Section: No


Hx Orthopedic Surgery: No


Other Surgical History: right inguinal hernia repaqir in 


Anesthesia Reaction: No





- PPD History


Previous Implant?: Yes


Implanted On Prior [facility] Admission?: Yes


Date: 09/10/18


Results: 0 mm


PPD to be Administered?: No





- Smoking Cessation


Smoking history: Current every day smoker


Have you smoked in the past 12 months: Yes


Aproximately how many cigarettes per day: 20


Hx Chewing Tobacco Use: No


Initiated information on smoking cessation: Yes


'Breaking Loose' booklet given: 06/10/19





- Substance & Tx. History


Hx Alcohol Use: Yes


Hx Substance Use: Yes


Substance Use Type: Alcohol, Heroin


Hx Substance Use Treatment: Yes ([facility] 18 to 09/10/18)





- Substances abused


  ** Alcohol


Substance route: Oral


Frequency: Daily


Amount used: 2 PINT OF VODKA, 6 BEERS 16 OUNCES


Age of first use: 12


Date of last use: 17





  ** Heroin


Substance route: Inhalation


Frequency: Daily


Amount used: 6-8 bags/daily


Age of first use: 14


Date of last use: 06/10/19





Family Disease History





- Family Disease History


Family Disease History: [place]: Mother (, ), Other: Father (no contact), 

Mother, Brother (three - living - no contact), Sister (two - living - ), Son (

two - living ), Daughter (one - living)





Admission Physical Exam [facility]





- Vital Signs


Vital Signs: 


 Vital Signs - 24 hr











  06/10/19





  09:18


 


Temperature 96.9 F L


 


Pulse Rate 48 L


 


Respiratory 18





Rate 


 


Blood Pressure 120/77














- Physical


General Appearance: Yes: Moderate Distress, Tremorous, Irritable, Sweating, 

Anxious


HEENTM: Yes: Normal ENT Inspection, NU, Pharynx Normal


Respiratory: Yes: Within Normal Limits, Lungs Clear, Normal Breath Sounds


Neck: Yes: Within Normal Limits, Supple, Trachea in good position


Breast: Yes: Within Normal Limits


Cardiology: Yes: Bradycardia


Abdominal: Yes: Within Normal Limits, Normal Bowel Sounds, Non Tender, Flat, 

Soft


Genitourinary: Yes: Within Normal Limits


Back: Yes: Muscle Spasm


Musculoskeletal: Yes: full range of Motion, Back pain, Muscle Pain


Extremities: Yes: Within Normal Limits, Tremors


Neurological: Yes: CNs II-XII NML intact, Fully Oriented, Alert, Motor Strength 

5/5


Integumentary: Yes: Dry, Petechiae, Other (tinea pedis)





- Diagnostic


(1) Opioid dependence with withdrawal


Current Visit: No   Status: Acute   





(2) syncope alcohol related


Current Visit: No   Status: Active   





(3) Alcohol dependence with uncomplicated withdrawal


Current Visit: No   Status: Acute   





(4) History of prediabetes


Current Visit: No   Status: Chronic   





(5) Tinea pedis of both feet


Current Visit: No   Status: Chronic   





(6) s/p right inguinal hernia repair


Current Visit: No   Status: Chronic   





(7) Hepatitis C


Current Visit: No   Status: Acute   





(8) Neuropathy


Current Visit: No   Status: Acute   





(9) Bradycardia


Current Visit: No   Status: Acute   





Cleared for Admission S





- Detox or Rehab


[facility] Level of Care: Medically Managed


Detox Regimen/Protocol: Methadone/Librium





Breathalyzer





- Breathalyzer


Breathalyzer: 0





Urine Drug Screen





- Test Device


Lot number: [id number]


Expiration date: 21





- Control


Is test valid?: Yes





- Results


Drug screen NEGATIVE: No


Urine drug screen results: FEN-Fentanyl, MOP-Opiates, BZO-Benzodiazepines





Inpatient Rehab Admission





- Rehab Decision to Admit


Inpatient rehab admission?: No

## 2019-06-10 NOTE — EKG
Test Reason : 

Blood Pressure : ***/*** mmHG

Vent. Rate : 045 BPM     Atrial Rate : 045 BPM

   P-R Int : 174 ms          QRS Dur : 100 ms

    QT Int : 460 ms       P-R-T Axes : 046 -36 023 degrees

   QTc Int : 397 ms

 

SINUS BRADYCARDIA

LEFT AXIS DEVIATION

INCOMPLETE RIGHT BUNDLE BRANCH BLOCK

ABNORMAL ECG

WHEN COMPARED WITH ECG OF 08-SEP-2018 12:58,

NO SIGNIFICANT CHANGE WAS FOUND

Confirmed by MILENA TREJO MD ([id number]) on 6/10/2019 11:48:25 AM

 

Referred By:             Confirmed By:MILENA TREJO MD

## 2019-06-10 NOTE — PN
BHS Progress Note


Note: 





history of hypertension and diabetes 


treated with amlodipine 


none compliance with medication 


bp elevation upon arrival to the detox unit


offer librium and methadone


repeat bp 


order clonidin 0.1 mg po prn for bp elevation

## 2019-06-11 RX ADMIN — GABAPENTIN SCH MG: 100 CAPSULE ORAL at 21:29

## 2019-06-11 RX ADMIN — GABAPENTIN SCH MG: 100 CAPSULE ORAL at 14:05

## 2019-06-11 RX ADMIN — Medication SCH TAB: at 10:24

## 2019-06-11 RX ADMIN — GABAPENTIN SCH MG: 100 CAPSULE ORAL at 05:56

## 2019-06-11 RX ADMIN — NICOTINE SCH MG: 21 PATCH TRANSDERMAL at 10:24

## 2019-06-11 RX ADMIN — Medication SCH MG: at 22:10

## 2019-06-11 NOTE — PN
S CIWA





- CIWA Score


Nausea/Vomitin


Muscle Tremors: 2


Anxiety: 2


Agitation: 2


Paroxysmal Sweats: 1-Minimal Palms Moist


Orientation: 0-Oriented


Tacttile Disturbances: 1-Very Mild Itch/Numbness


Auditory Disturbances: 1-Very Mild


Visual Disturbances: 0-None


Headache: 2-Mild


CIWA-Ar Total Score: 13





BHS COWS





- Scale


Resting Pulse: 0= [place] 80 or Below


Sweatin= Chills/Flushing


Restless Observation: 1= Difficult to Sit Still


Pupil Size: 1= Pupils >than Normal


Bone or Joint Aches: 2= Severe Diffuse Aches


Runny Nose/ Eye Tearin= Runny Nose/Eyes


GI Upset > 30mins: 2= Nausea/Diarrhea


Tremor Observation of Outstretched Hands: 2= Slight Tremor Visible


Yawning Observation: 1= 1-2x During Session


Anxiety or Irritability: 2=Irritable/Anxious


Goose Flesh Skin: 0=Smooth Skin


COWS Score: 14





BHS Progress Note (SOAP)


Subjective: 





alert,irritable,anxious,interrupted sleep,pain in the body and back


Objective: 





19 12:07


 Vital Signs











Temperature  98.1 F   19 09:02


 


Pulse Rate  59 L  19 09:02


 


Respiratory Rate  18   19 09:02


 


Blood Pressure  131/80   19 09:02


 


O2 Sat by Pulse Oximetry (%)      








 Laboratory Last Values











WBC  5.2 K/mm3 (4.0-10.0)   06/10/19  10:25    


 


RBC  4.80 M/mm3 (4.00-5.60)   06/10/19  10:25    


 


Hgb  13.9 GM/dL (11.7-16.9)   06/10/19  10:25    


 


Hct  41.8 % (35.4-49)   06/10/19  10:25    


 


MCV  87.2 fl (80-96)   06/10/19  10:25    


 


MCH  28.9 pg (25.7-33.7)   06/10/19  10:25    


 


MCHC  33.1 g/dl (32.0-35.9)   06/10/19  10:25    


 


RDW  14.7 % (11.9-15.9)   06/10/19  10:25    


 


Plt Count  161 K/MM3 (134-434)  D 06/10/19  10:25    


 


MPV  10.0 fl (7.5-11.1)   06/10/19  10:25    


 


Sodium  138 mmol/L (136-145)   06/10/19  10:25    


 


Potassium  4.1 mmol/L (3.5-5.1)   06/10/19  10:25    


 


Chloride  104 mmol/L ()   06/10/19  10:25    


 


Carbon Dioxide  28 mmol/L (21-32)   06/10/19  10:25    


 


Anion Gap  5 MMOL/L (8-16)  L  06/10/19  10:25    


 


BUN  20.8 mg/dL (7-18)  H  06/10/19  10:25    


 


Creatinine  1.1 mg/dL (0.55-1.3)   06/10/19  10:25    


 


Est GFR (CKD-EPI)AfAm  84.12   06/10/19  10:25    


 


Est GFR (CKD-EPI)NonAf  72.58   06/10/19  10:25    


 


POC Glucometer  162 UNITS ()   19  05:55    


 


Random Glucose  128 mg/dL ()  H  06/10/19  10:25    


 


Calcium  8.9 mg/dL (8.5-10.1)   06/10/19  10:25    


 


Total Bilirubin  0.3 mg/dL (0.2-1)   06/10/19  10:25    


 


AST  64 U/L (15-37)  H  06/10/19  10:25    


 


ALT  81 U/L (13-61)  H  06/10/19  10:25    


 


Alkaline Phosphatase  73 U/L ()   06/10/19  10:25    


 


Total Protein  7.2 g/dl (6.4-8.2)   06/10/19  10:25    


 


Albumin  3.6 g/dl (3.4-5.0)   06/10/19  10:25    


 


Urine Color  Yellow   06/10/19  16:33    


 


Urine Appearance  Clear   06/10/19  16:33    


 


Urine pH  5.0  (5.0-8.0)   06/10/19  16:33    


 


Ur Specific Gravity  1.018  (1.010-1.035)   06/10/19  16:33    


 


Urine Protein  Negative  (NEGATIVE)   06/10/19  16:33    


 


Urine Glucose (UA)  Negative  (NEGATIVE)   06/10/19  16:33    


 


Urine Ketones  Negative  (NEGATIVE)   06/10/19  16:33    


 


Urine Blood  Negative  (NEGATIVE)   06/10/19  16:33    


 


Urine Nitrite  Negative  (NEGATIVE)   06/10/19  16:33    


 


Urine Bilirubin  Negative  (NEGATIVE)   06/10/19  16:33    


 


Urine Urobilinogen  0.2 mg/dL (0.2-1.0)   06/10/19  16:33    


 


Ur Leukocyte Esterase  Negative  (NEGATIVE)   06/10/19  16:33    


 


RPR Titer  Nonreactive  (NONREACTIVE)   06/10/19  10:25    











Assessment: 





19 12:08


withdrawal symptom


Plan: 





continue detox,bgm monitoring

## 2019-06-12 VITALS — DIASTOLIC BLOOD PRESSURE: 73 MMHG | HEART RATE: 53 BPM | SYSTOLIC BLOOD PRESSURE: 115 MMHG | TEMPERATURE: 98.4 F

## 2019-06-12 RX ADMIN — GABAPENTIN SCH MG: 100 CAPSULE ORAL at 05:20

## 2019-06-12 RX ADMIN — NICOTINE SCH: 21 PATCH TRANSDERMAL at 10:12

## 2019-06-12 RX ADMIN — Medication SCH TAB: at 10:12

## 2019-06-12 NOTE — DS
[facility]HS Detox Discharge Summary


Admission Date: 


06/10/19





Discharge Date: 06/12/19





- History


Present History: Alcohol Dependence, Opioid Dependence


Pertinent Past History: 





this 58 years old male with heroin and alcohol dependence. here for 2 days. 

Says he wants to leave "can't explain" it. d/w pt options of methadone or 

suboxone MAT and f/u PCP- does not need medications. d/w Narcan kit





- Physical Exam Results


Vital Signs: 


 Vital Signs











Temperature  98.4 F   06/12/19 09:32


 


Pulse Rate  53 L  06/12/19 09:32


 


Respiratory Rate  20   06/12/19 09:32


 


Blood Pressure  115/73   06/12/19 09:32


 


O2 Sat by Pulse Oximetry (%)      














- Medication


Discharge Medications: 


Ambulatory Orders





Gabapentin 100 mg PO TID 06/10/19 











- AMA


Did Patient Leave Against Medical Advice: Yes

## 2019-07-12 ENCOUNTER — HOSPITAL ENCOUNTER (INPATIENT)
Dept: HOSPITAL 74 - YASAS | Age: 59
LOS: 1 days | Discharge: LEFT BEFORE DISCHARGE | DRG: 770 | End: 2019-07-13
Attending: SURGERY | Admitting: SURGERY
Payer: COMMERCIAL

## 2019-07-12 VITALS — BODY MASS INDEX: 28.6 KG/M2

## 2019-07-12 DIAGNOSIS — F32.9: ICD-10-CM

## 2019-07-12 DIAGNOSIS — F11.23: Primary | ICD-10-CM

## 2019-07-12 DIAGNOSIS — R73.03: ICD-10-CM

## 2019-07-12 DIAGNOSIS — F10.230: ICD-10-CM

## 2019-07-12 DIAGNOSIS — F17.210: ICD-10-CM

## 2019-07-12 DIAGNOSIS — B18.2: ICD-10-CM

## 2019-07-12 DIAGNOSIS — R03.0: ICD-10-CM

## 2019-07-12 PROCEDURE — HZ2ZZZZ DETOXIFICATION SERVICES FOR SUBSTANCE ABUSE TREATMENT: ICD-10-PCS | Performed by: SURGERY

## 2019-07-12 NOTE — HP
COWS





- Scale


Resting Pulse: 0= [place] 80 or Below


Sweatin=Flushed/Facial Moisture


Restless Observation: 1= Difficult to Sit Still


Pupil Size: 0= Normal to Room Light


Bone or Joint Aches: 0= None


Runny Nose/ Eye Tearin= None


GI Upset > 30mins: 2= Nausea/Diarrhea


Tremor Observation: 0= None


Yawning Observation: 0= None


Anxiety or Irritability: 2=Irritable/Anxious


Goose Flesh Skin: 0=Smooth Skin


COWS Score: 7





CIWA Score


Nausea/Vomitin-No Nausea/No Vomiting


Muscle Tremors: 4-Moderate,w/Arms Extend


Anxiety: 2


Agitation: 0-Normal Activity


Paroxysmal Sweats: 2


Orientation: 0-Oriented


Tacttile Disturbances: 2-Mild Itch/Numbness/Burn


Auditory Disturbances: 0-None


Visual Disturbances: 0-None


Headache: 0-None Present


CIWA-Ar Total Score: 10





- Admission Criteria


[facility] Guidelines: Admission for Medically Managed Detox: 


Requires at least one of the followin. CIWA greater than 12


2. Seizures within the past 24 hours


3. Delirium tremens within the past 24 hours


4. Hallucinations within the past 24 hours


5. Acute intervention needed for co  occurring medical disorder


6. Acute intervention needed for co  occurring psychiatric disorder


7. Severe withdrawal that cannot be handled at a lower level of care (continued


    vomiting, continued diarrhea, abnormal vital signs) requiring intravenous


    medication and/or fluids


8. Pregnancy








Admission ROS [facility]


Allergies/Adverse Reactions: 


 Allergies











Allergy/AdvReac Type Severity Reaction Status Date / Time


 


No Known Allergies Allergy   Verified 19 09:59











History of Present Illness: 





pt here requesting detox from etoh and heroin , reports use since age 14  , 

current daily use  7-8 bags/day heroin via  inhalation , denies ivdu  , latest 

use this  morning , current symptoms as above  , claims latest  detox 1  mo ago

  at this facility , denies methadone use  in the interim  . 


etoh : 7-8 x 12  oz  can  beer, and  1  pint  , reports he starts drinking  

upon awakening  , + blackouts, denies seizures  or tremors  , latest use this  

morning , anticipates worsening symptoms later  . 


cannabis : "  not much "  1-2  days/week  


tobacco :  1  ppd  


PMHX : borderline dm 


Exam Limitations: No Limitations





- Ebola screening


Have you traveled outside of the country in the last 21 days: No


Have you had contact with anyone from an Ebola affected area: No


Do you have a fever: No





- Review of Systems


Constitutional: No Symptoms Reported


EENT: reports: No Symptoms Reported


Respiratory: reports: No Symptoms reported


Cardiac: reports: No Symptoms Reported


GI: reports: See HPI


: reports: No Symptoms Reported


Musculoskeletal: reports: See HPI


Neuro: reports: No Symptoms reported


Endocrine: reports: See HPI


Psychiatric: reports: Orientated x3, Anxious





Patient History





- Patient Medical History


Hx Anemia: No


Hx Asthma: No


Hx Chronic Obstructive Pulmonary Disease (COPD): No


Hx Cancer: No


Hx Cardiac Disorders: No


Hx Congestive Heart Failure: No


Hx Hypertension: No


Hx Hypercholesterolemia: No


Hx Pacemaker: No


HX Cerebrovascular Accident: No


Hx Seizures: No


Hx Dementia: No


Hx Diabetes: Yes (pre diabetes)


Hx Gastrointestinal Disorders: No


Hx Liver Disease: No


Hx Genitourinary Disorders: No


Hx Sexually Transmitted Disorders: No


Hx Renal Disease (ESRD): No


Hx Thyroid Disease: No


Hx Human Immunodeficiency Virus (HIV): No (negative last )


Hx Hepatitis C: Yes (since  - never treated)


Hx Depression: Yes (no meds)


Hx Suicide Attempt: No (denies (everday I use drugs it's like suicide))


Hx Bipolar Disorder: No


Hx Schizophrenia: No





- Patient Surgical History


Past Surgical History: Yes


Hx Neurologic Surgery: No


Hx Cataract Extraction: No


Hx Cardiac Surgery: No


Hx Lung Surgery: No


Hx Breast Surgery: No


Hx Breast Biopsy: No


Hx Abdominal Surgery: Yes (right inguinal hernia in )


Hx Appendectomy: No


Hx Cholecystectomy: No


Hx Genitourinary Surgery: No


Hx  Section: No


Hx Orthopedic Surgery: No


Other Surgical History: right inguinal hernia repaqir in 


Anesthesia Reaction: No





- PPD History


Date: 09/10/18


Results: 0 mm





- Smoking Cessation


Smoking history: Current every day smoker


Have you smoked in the past 12 months: Yes


Aproximately how many cigarettes per day: 20


Hx Chewing Tobacco Use: No


Initiated information on smoking cessation: No





- Substances abused


  ** Alcohol


Substance route: Oral


Frequency: Daily


Amount used: 1 PINT OF VODKA, 6 BEERS 8/16 OUNCES


Age of first use: 14


Date of last use: 19





  ** Heroin


Substance route: Inhalation


Frequency: Daily


Amount used: 7-8 bags/daily


Age of first use: 14


Date of last use: 19





Family Disease History





- Family Disease History


Family Disease History: [place]: Mother (, ), Other: Father (no contact), 

Mother, Brother (three - living - no contact), Sister (two - living - ), Son (

two - living ), Daughter (one - living)





Admission Physical Exam [facility]





- Vital Signs


Vital Signs: 


 Vital Signs - 24 hr











  19





  09:54 10:25


 


Temperature 98.0 F 98.0 F


 


Pulse Rate 51 L 51 L


 


Respiratory 16 16





Rate  


 


Blood Pressure 147/95 147/95














- Physical


General Appearance: Yes: Disheveled, Mild Distress, Anxious


HEENTM: Yes: Hearing grossly Normal, Normocephalic, Normal Voice


Respiratory: Yes: Lungs Clear, Normal Breath Sounds, No Respiratory Distress, 

No Accessory Muscle Use


Neck: Yes: No masses,lesions,Nodules, Trachea in good position


Cardiology: Yes: Regular Rhythm, Regular Rate, S1, S2


Abdominal: Yes: Non Tender, Soft


Back: Yes: Normal Inspection


Extremities: Yes: Normal Range of Motion, Non-Tender, Tremors


Neurological: Yes: Fully Oriented, Alert, Motor Strength 5/5


Integumentary: Yes: Warm





- Diagnostic


(1) Alcohol dependence with uncomplicated withdrawal


Current Visit: Yes   Status: Acute   





(2) Opioid dependence with withdrawal


Current Visit: Yes   Status: Acute   





(3) Nicotine dependence


Current Visit: No   Status: Chronic   


Qualifiers: 


   Nicotine product type: cigarettes   Substance use status: uncomplicated   

Qualified Code(s): F17.210 - Nicotine dependence, cigarettes, uncomplicated   





Breathalyzer





- Breathalyzer


Breathalyzer: 0





Urine Drug Screen





- Test Device


Lot number: [id number]


Expiration date: 21





- Control


Is test valid?: Yes





- Results


Drug screen NEGATIVE: No


Urine drug screen results: THC-Marijuana, FEN-Fentanyl, MOP-Opiates, OXY-

Oxycodone, MTD-Methadone, BZO-Benzodiazepines





Inpatient Rehab Admission





- Rehab Decision to Admit


Inpatient rehab admission?: No

## 2019-07-13 VITALS — TEMPERATURE: 98.4 F | SYSTOLIC BLOOD PRESSURE: 146 MMHG | HEART RATE: 72 BPM | DIASTOLIC BLOOD PRESSURE: 100 MMHG

## 2019-07-13 NOTE — DS
[facility] Detox Discharge Summary


Admission Date: 


07/12/19





Discharge Date: 07/13/19





- History


Present History: Alcohol Dependence, Opioid Dependence


Additional Comments: 





DESPITE EFFORTS BY NP AND BY NURSING STAFF TO ADDRESS PATIENT'S MEDICAL NEEDS / 

CONCERNS, PATIENT DOES NOT WISH TO REMAIN TO COMPLETE DETOX REGIMEN. RISKS OF 

LEAVING DETOX UNIT AGAINST MEDICAL ADVICE AND PRIOR TO COMPLETION OF DETOX 

REGIMEN EXPLAINED TO PATIENT. PATIENT ADVISED TO GO IMMEDIATELY TO NEAREST ER 

SHOULD ANY INTOLERABLE WITHDRAWAL / DETOX SYMPTOMS DEVELOP AT ANY TIME. PATIENT 

VERBALIZED UNDERSTANDING OF ALL INFORMATION / RECOMMENDATIONS PRESENTED TO HIM 

PRIOR TO DEPARTURE FROM DETOX UNIT. PATIENT LEFT DETOX UNIT IN STABLE MEDICAL 

CONDITION.


Pertinent Past History: 





Nicotine Dependence, Depression, Hep C, Pre-Diabetes, Elevated Blood Pressure 

Reading.





- Physical Exam Results


Vital Signs: 


 Vital Signs











Temperature  98.4 F   07/13/19 14:29


 


Pulse Rate  72   07/13/19 14:29


 


Respiratory Rate  18   07/13/19 14:29


 


Blood Pressure  146/100   07/13/19 14:29


 


O2 Sat by Pulse Oximetry (%)      











Pertinent Admission Physical Exam Findings: 





WITHDRAWAL SYMPTOMS.








NOTE: PATIENT REFUSED TO HAVE ADMISSION LABS DRAWN.





- Treatment


Hospital Course: Detoxed Safely





- Diagnosis


(1) Alcohol dependence with uncomplicated withdrawal


Status: Acute   





(2) Opioid dependence with withdrawal


Status: Acute   





(3) Nicotine dependence


Status: Chronic   


Qualifiers: 


   Nicotine product type: cigarettes   Substance use status: uncomplicated   

Qualified Code(s): F17.210 - Nicotine dependence, cigarettes, uncomplicated   





(4) Elevated blood pressure reading in office with diagnosis of hypertension


Status: Acute   





- AMA


Did Patient Leave Against Medical Advice: Yes (PATIENT DID NOT WWISH TO REMAIN 

TO COMPLETE DETOX REGIMEN.)

## 2019-07-13 NOTE — PN
[facility] COWS





- Scale


Resting Pulse: 0= [place] 80 or Below


Sweating: 3= Beads of Sweat on Face


Restless Observation: 5= Unable to Sit Still


Pupil Size: 1= Pupils >than Normal


Bone or Joint Aches: 4=Acute Joint/Muscle Pain


Runny Nose/ Eye Tearin= Runny Nose/Eyes


GI Upset > 30mins: 3= Vomiting/Diarrhea


Tremor Observation of Outstretched Hands: 4= Gross Tremor/Twitching


Yawning Observation: 1= 1-2x During Session


Anxiety or Irritability: 4=Extreme Anxiety


Goose Flesh Skin: 0=Smooth Skin


COWS Score: 27





[facility] Progress Note (SOAP)


Subjective: 





c/o withdrawal sx's.


Objective: 





19 07:25


cows 27


a/o x3 moderate distress


perrla


cv rrr


19 07:27


 Vital Signs











Temperature  98.4 F   19 06:53


 


Pulse Rate  65   19 06:53


 


Respiratory Rate  18   19 06:53


 


Blood Pressure  165/118 H  19 06:53


 


O2 Sat by Pulse Oximetry (%)      











Assessment: 





19 07:26


opiate withdrawal


Plan: 





methadone 10 mg po stat


continue detox

## 2019-07-13 NOTE — PN
S CIWA





- CIWA Score


Nausea/Vomitin


Muscle Tremors: 2


Anxiety: 5


Agitation: 3


Paroxysmal Sweats: 2


Orientation: 0-Oriented


Tacttile Disturbances: 0-None


Auditory Disturbances: 1-Very Mild


Visual Disturbances: 0-None


Headache: 0-None Present


CIWA-Ar Total Score: 18





[facility] COWS





- Scale


Resting Pulse: 0= [place] 80 or Below


Sweatin= Chills/Flushing


Restless Observation: 1= Difficult to Sit Still


Pupil Size: 0= Normal to Room Light


Bone or Joint Aches: 2= Severe Diffuse Aches


Runny Nose/ Eye Tearin= None


GI Upset > 30mins: 3= Vomiting/Diarrhea


Tremor Observation of Outstretched Hands: 2= Slight Tremor Visible


Yawning Observation: 1= 1-2x During Session


Anxiety or Irritability: 4=Extreme Anxiety


Goose Flesh Skin: 0=Smooth Skin


COWS Score: 14





[facility] Progress Note (SOAP)


Subjective: 





Sweating, Tremors, Anxious, Agitated, Body Aches, Vomiting.


Objective: 


PATIENT A & O X 3, OBSERVED AMBULATING ON UNIT UNASSISTED. IN NO ACUTE DISTRESS.





19 14:57


 Vital Signs











Temperature  98.4 F   19 14:29


 


Pulse Rate  72   19 14:29


 


Respiratory Rate  18   19 14:29


 


Blood Pressure  146/100   19 14:29


 


O2 Sat by Pulse Oximetry (%)      














ADMISSION LAB RESULTS PENDING.





19 14:58





Assessment: 





19 14:58


WITHDRAWAL SYMPTOMS.


ELEVATED BLOOD PRESSURE.





19 15:12


Plan: 





CONTINUE DETOX.


CLONIDINE, 0.1 MG PO (PRN) FOR ELEVATED BLOOD PRESSURE. WILL CONTINUE TO 

MONITOR BLOOD PRESSURE.

## 2019-10-29 ENCOUNTER — HOSPITAL ENCOUNTER (INPATIENT)
Dept: HOSPITAL 74 - YASAS | Age: 59
LOS: 2 days | Discharge: LEFT BEFORE DISCHARGE | DRG: 770 | End: 2019-10-31
Attending: ALLERGY & IMMUNOLOGY | Admitting: ALLERGY & IMMUNOLOGY
Payer: COMMERCIAL

## 2019-10-29 VITALS — BODY MASS INDEX: 28.9 KG/M2

## 2019-10-29 DIAGNOSIS — F10.230: Primary | ICD-10-CM

## 2019-10-29 DIAGNOSIS — K04.7: ICD-10-CM

## 2019-10-29 DIAGNOSIS — B18.2: ICD-10-CM

## 2019-10-29 DIAGNOSIS — F17.213: ICD-10-CM

## 2019-10-29 DIAGNOSIS — F19.24: ICD-10-CM

## 2019-10-29 DIAGNOSIS — I10: ICD-10-CM

## 2019-10-29 DIAGNOSIS — R73.03: ICD-10-CM

## 2019-10-29 DIAGNOSIS — R60.0: ICD-10-CM

## 2019-10-29 DIAGNOSIS — F11.23: ICD-10-CM

## 2019-10-29 DIAGNOSIS — F32.9: ICD-10-CM

## 2019-10-29 PROCEDURE — HZ2ZZZZ DETOXIFICATION SERVICES FOR SUBSTANCE ABUSE TREATMENT: ICD-10-PCS | Performed by: ALLERGY & IMMUNOLOGY

## 2019-10-29 RX ADMIN — NICOTINE SCH MG: 21 PATCH TRANSDERMAL at 15:38

## 2019-10-29 RX ADMIN — GABAPENTIN SCH MG: 300 CAPSULE ORAL at 21:16

## 2019-10-29 RX ADMIN — HYDROXYZINE PAMOATE PRN MG: 25 CAPSULE ORAL at 21:16

## 2019-10-29 RX ADMIN — Medication SCH MG: at 21:17

## 2019-10-29 NOTE — PN
Teaching Attending Note


Name of Resident: Jason Wyman





ATTENDING PHYSICIAN STATEMENT





I saw and evaluated the patient.


I reviewed the resident's note and discussed the case with the resident.


I agree with the resident's findings and plan as documented.








SUBJECTIVE:  pt here requesting detox from etoh and heroin , reports use since 

age 14  , current daily use 10 bags/day heroin via  inhalation , denies ivdu  , 

latest use this  morning , current symptoms as above COWS =12 , . 


etoh : 6 x 12  oz  cans of  beer, and 6 nips/day, reports he starts drinking  

upon awakening , + blackouts, denies seizures  or tremors  , latest use this  

morning   CIWA =14 


cannabis : "  not much "  1-2  days/week  


tobacco :  1  ppd  


PMHX : borderline dm  , hep C  no tx  








OBJECTIVE:  wnwd  , + UE tremors 


ext  RIght  medial  ankle edema ,  tenderness to palpation and  w/ weight- 

bearing, pt denies injuries , states he has been walking  a lot,  declined XR  

, signed AMA . 





Search Terms: german morgan, 1960


Search Date: 10/29/2019 02:16:14 PM





This report was requested by: Ivanna Ag | Reference #: [id number]





There are no results for the search terms that you entered.





 Vital Signs - 24 hr











  10/29/19





  12:17


 


Pulse Rate 60


 


Respiratory 16





Rate 


 


Blood Pressure 118/80

















ASSESSMENT AND PLAN: Opioid use disorder  - Methadone  detox  


Alcohol use disorder - Librium detox

## 2019-10-30 LAB
ALBUMIN SERPL-MCNC: 3.4 G/DL (ref 3.4–5)
ALP SERPL-CCNC: 57 U/L (ref 45–117)
ALT SERPL-CCNC: 63 U/L (ref 13–61)
ANION GAP SERPL CALC-SCNC: 7 MMOL/L (ref 8–16)
AST SERPL-CCNC: 58 U/L (ref 15–37)
BILIRUB SERPL-MCNC: 1 MG/DL (ref 0.2–1)
BUN SERPL-MCNC: 8.4 MG/DL (ref 7–18)
CALCIUM SERPL-MCNC: 8.9 MG/DL (ref 8.5–10.1)
CHLORIDE SERPL-SCNC: 104 MMOL/L (ref 98–107)
CO2 SERPL-SCNC: 28 MMOL/L (ref 21–32)
CREAT SERPL-MCNC: 0.8 MG/DL (ref 0.55–1.3)
DEPRECATED RDW RBC AUTO: 14.6 % (ref 11.9–15.9)
GLUCOSE SERPL-MCNC: 105 MG/DL (ref 74–106)
HCT VFR BLD CALC: 45.4 % (ref 35.4–49)
HGB BLD-MCNC: 15 GM/DL (ref 11.7–16.9)
MCH RBC QN AUTO: 28.2 PG (ref 25.7–33.7)
MCHC RBC AUTO-ENTMCNC: 33 G/DL (ref 32–35.9)
MCV RBC: 85.5 FL (ref 80–96)
PLATELET # BLD AUTO: 185 K/MM3 (ref 134–434)
PMV BLD: 9.9 FL (ref 7.5–11.1)
POTASSIUM SERPLBLD-SCNC: 4 MMOL/L (ref 3.5–5.1)
PROT SERPL-MCNC: 7.1 G/DL (ref 6.4–8.2)
RBC # BLD AUTO: 5.31 M/MM3 (ref 4–5.6)
SODIUM SERPL-SCNC: 139 MMOL/L (ref 136–145)
WBC # BLD AUTO: 5.8 K/MM3 (ref 4–10)

## 2019-10-30 RX ADMIN — Medication SCH MG: at 21:34

## 2019-10-30 RX ADMIN — AMOXICILLIN SCH MG: 500 CAPSULE ORAL at 13:31

## 2019-10-30 RX ADMIN — IBUPROFEN PRN MG: 400 TABLET, FILM COATED ORAL at 17:44

## 2019-10-30 RX ADMIN — CHLORHEXIDINE GLUCONATE SCH ML: 1.2 RINSE ORAL at 13:32

## 2019-10-30 RX ADMIN — Medication SCH TAB: at 10:19

## 2019-10-30 RX ADMIN — METHOCARBAMOL PRN MG: 500 TABLET ORAL at 10:19

## 2019-10-30 RX ADMIN — GABAPENTIN SCH MG: 300 CAPSULE ORAL at 10:19

## 2019-10-30 RX ADMIN — METHOCARBAMOL PRN MG: 500 TABLET ORAL at 17:43

## 2019-10-30 RX ADMIN — NICOTINE SCH MG: 21 PATCH TRANSDERMAL at 10:21

## 2019-10-30 RX ADMIN — GABAPENTIN SCH MG: 300 CAPSULE ORAL at 21:34

## 2019-10-30 RX ADMIN — AMOXICILLIN SCH MG: 500 CAPSULE ORAL at 21:34

## 2019-10-30 RX ADMIN — CHLORHEXIDINE GLUCONATE SCH ML: 1.2 RINSE ORAL at 21:34

## 2019-10-30 NOTE — PN
[facility] CIWA





- CIWA Score


Nausea/Vomitin-Mild Nausea/No Vomiting


Muscle Tremors: 3


Anxiety: 3


Agitation: 2


Paroxysmal Sweats: 1-Minimal Palms Moist


Orientation: 1-Uncertain about Date (date of week)


Tacttile Disturbances: 0-None


Auditory Disturbances: 0-None


Visual Disturbances: 0-None


Headache: 0-None Present


CIWA-Ar Total Score: 11





[facility] COWS





- Scale


Resting Pulse: 0= [place] 80 or Below


Sweatin= Chills/Flushing


Restless Observation: 0= Sits Still


Pupil Size: 0= Normal to Room Light


Bone or Joint Aches: 1= Mild Discomfort


Runny Nose/ Eye Tearin= Nasal Congestion


GI Upset > 30mins: 1= Stomach Cramp


Tremor Observation of Outstretched Hands: 2= Slight Tremor Visible


Yawning Observation: 0= None


Anxiety or Irritability: 2=Irritable/Anxious


Goose Flesh Skin: 3=Piloerection


COWS Score: 11





[facility] Progress Note (SOAP)


Subjective: 





59 years old male admitted on 10/29/19 for alcohol and opiate withdrawal sx 

management 


treated with librium and methadone detox regimen


c/o right lower tooth missing x "months"


gum swell no bleed noted


patient denies allergic to amoxicilline


amocilline 500 mg po tid 


chlorhexidine mouth rinse


no lymph edema palpated


speech clearly 


no drooling


Objective: 





10/30/19 11:26


 Vital Signs











Temperature  99.3 F   10/30/19 09:03


 


Pulse Rate  70   10/30/19 09:03


 


Respiratory Rate  18   10/30/19 09:03


 


Blood Pressure  141/87   10/30/19 09:03


 


O2 Sat by Pulse Oximetry (%)      








 Laboratory Last Values











WBC  5.8 K/mm3 (4.0-10.0)   10/30/19  08:00    


 


RBC  5.31 M/mm3 (4.00-5.60)   10/30/19  08:00    


 


Hgb  15.0 GM/dL (11.7-16.9)   10/30/19  08:00    


 


Hct  45.4 % (35.4-49)   10/30/19  08:00    


 


MCV  85.5 fl (80-96)   10/30/19  08:00    


 


MCH  28.2 pg (25.7-33.7)   10/30/19  08:00    


 


MCHC  33.0 g/dl (32.0-35.9)   10/30/19  08:00    


 


RDW  14.6 % (11.9-15.9)   10/30/19  08:00    


 


Plt Count  185 K/MM3 (134-434)   10/30/19  08:00    


 


MPV  9.9 fl (7.5-11.1)   10/30/19  08:00    


 


Sodium  139 mmol/L (136-145)   10/30/19  08:00    


 


Potassium  4.0 mmol/L (3.5-5.1)   10/30/19  08:00    


 


Chloride  104 mmol/L ()   10/30/19  08:00    


 


Carbon Dioxide  28 mmol/L (21-32)   10/30/19  08:00    


 


Anion Gap  7 MMOL/L (8-16)  L  10/30/19  08:00    


 


BUN  8.4 mg/dL (7-18)   10/30/19  08:00    


 


Creatinine  0.8 mg/dL (0.55-1.3)   10/30/19  08:00    


 


Est GFR (CKD-EPI)AfAm  111.74   10/30/19  08:00    


 


Est GFR (CKD-EPI)NonAf  96.41   10/30/19  08:00    


 


POC Glucometer  115 UNITS ()   10/30/19  05:21    


 


Random Glucose  105 mg/dL ()   10/30/19  08:00    


 


Calcium  8.9 mg/dL (8.5-10.1)   10/30/19  08:00    


 


Total Bilirubin  1.0 mg/dL (0.2-1)   10/30/19  08:00    


 


AST  58 U/L (15-37)  H  10/30/19  08:00    


 


ALT  63 U/L (13-61)  H  10/30/19  08:00    


 


Alkaline Phosphatase  57 U/L ()   10/30/19  08:00    


 


Total Protein  7.1 g/dl (6.4-8.2)   10/30/19  08:00    


 


Albumin  3.4 g/dl (3.4-5.0)   10/30/19  08:00    








lab noted


Assessment: 





10/30/19 11:26


alcohol and opiate withdrawal sx


tooth abscess


Plan: 





continue librium and methadone detox regmen


amoxilline 


chlorhexidine


discuss oral hygiene

## 2019-10-31 VITALS — DIASTOLIC BLOOD PRESSURE: 85 MMHG | SYSTOLIC BLOOD PRESSURE: 111 MMHG | TEMPERATURE: 98.4 F | HEART RATE: 63 BPM

## 2019-10-31 RX ADMIN — GABAPENTIN SCH MG: 300 CAPSULE ORAL at 10:09

## 2019-10-31 RX ADMIN — Medication SCH TAB: at 10:09

## 2019-10-31 RX ADMIN — CHLORHEXIDINE GLUCONATE SCH ML: 1.2 RINSE ORAL at 10:09

## 2019-10-31 RX ADMIN — METHOCARBAMOL PRN MG: 500 TABLET ORAL at 05:34

## 2019-10-31 RX ADMIN — IBUPROFEN PRN MG: 400 TABLET, FILM COATED ORAL at 05:35

## 2019-10-31 RX ADMIN — HYDROXYZINE PAMOATE PRN MG: 25 CAPSULE ORAL at 05:34

## 2019-10-31 RX ADMIN — NICOTINE SCH: 21 PATCH TRANSDERMAL at 10:10

## 2019-10-31 RX ADMIN — AMOXICILLIN SCH MG: 500 CAPSULE ORAL at 06:38

## 2019-10-31 NOTE — DS
[facility] Detox Discharge Summary


Admission Date: 


10/29/19





Discharge Date: 10/31/19





- History


Present History: Alcohol Dependence, Opioid Dependence


Additional Comments: 





59 years old male admitted on 10/29/19 for alcohol and opiate withdrawal sx 

management


treated with librium and methadone detox regimen


patient reported that he made dental appointment today 


patient insists to leave today that dentist can no delay





patient is alert oriented x 3 


refuse exit physical examine refuse exist ciwa cows 





- Physical Exam Results


Vital Signs: 


 Vital Signs











Temperature  98.4 F   10/31/19 09:07


 


Pulse Rate  63   10/31/19 09:07


 


Respiratory Rate  18   10/31/19 09:07


 


Blood Pressure  111/85   10/31/19 09:07


 


O2 Sat by Pulse Oximetry (%)      











Pertinent Admission Physical Exam Findings: 





alcohol and opiate withdrawal sx


 Laboratory Last Values











WBC  5.8 K/mm3 (4.0-10.0)   10/30/19  08:00    


 


RBC  5.31 M/mm3 (4.00-5.60)   10/30/19  08:00    


 


Hgb  15.0 GM/dL (11.7-16.9)   10/30/19  08:00    


 


Hct  45.4 % (35.4-49)   10/30/19  08:00    


 


MCV  85.5 fl (80-96)   10/30/19  08:00    


 


MCH  28.2 pg (25.7-33.7)   10/30/19  08:00    


 


MCHC  33.0 g/dl (32.0-35.9)   10/30/19  08:00    


 


RDW  14.6 % (11.9-15.9)   10/30/19  08:00    


 


Plt Count  185 K/MM3 (134-434)   10/30/19  08:00    


 


MPV  9.9 fl (7.5-11.1)   10/30/19  08:00    


 


Sodium  139 mmol/L (136-145)   10/30/19  08:00    


 


Potassium  4.0 mmol/L (3.5-5.1)   10/30/19  08:00    


 


Chloride  104 mmol/L ()   10/30/19  08:00    


 


Carbon Dioxide  28 mmol/L (21-32)   10/30/19  08:00    


 


Anion Gap  7 MMOL/L (8-16)  L  10/30/19  08:00    


 


BUN  8.4 mg/dL (7-18)   10/30/19  08:00    


 


Creatinine  0.8 mg/dL (0.55-1.3)   10/30/19  08:00    


 


Est GFR (CKD-EPI)AfAm  111.74   10/30/19  08:00    


 


Est GFR (CKD-EPI)NonAf  96.41   10/30/19  08:00    


 


POC Glucometer  136 UNITS ()   10/30/19  19:45    


 


Random Glucose  105 mg/dL ()   10/30/19  08:00    


 


Calcium  8.9 mg/dL (8.5-10.1)   10/30/19  08:00    


 


Total Bilirubin  1.0 mg/dL (0.2-1)   10/30/19  08:00    


 


AST  58 U/L (15-37)  H  10/30/19  08:00    


 


ALT  63 U/L (13-61)  H  10/30/19  08:00    


 


Alkaline Phosphatase  57 U/L ()   10/30/19  08:00    


 


Total Protein  7.1 g/dl (6.4-8.2)   10/30/19  08:00    


 


Albumin  3.4 g/dl (3.4-5.0)   10/30/19  08:00    


 


RPR Titer  Nonreactive  (NONREACTIVE)   10/30/19  08:00    








lab noted





- Treatment


Hospital Course: Detox Protocol Followed, Responded well


Patient has Accepted a Rehab Referral to: [facility]





- Medication


Discharge Medications: 


Ambulatory Orders





Gabapentin [Neurontin] 300 mg PO BID 10/29/19 


Naloxone HCl [Narcan] 4 mg NS ASDIR PRN #1 spray 10/30/19 











- Diagnosis


(1) Tooth abscess


Current Visit: Yes   Status: Acute   





(2) Alcohol dependence with uncomplicated withdrawal


Current Visit: Yes   Status: Acute   





(3) Hepatitis C


Current Visit: Yes   Status: Chronic   


Qualifiers: 


   Viral hepatitis chronicity: carrier   Qualified Code(s): B18.2 - Chronic 

viral hepatitis C   





(4) Hypertension


Current Visit: Yes   Status: Chronic   


Qualifiers: 


   Hypertension type: essential hypertension   Qualified Code(s): I10 - 

Essential (primary) hypertension   





(5) Opioid dependence with withdrawal


Current Visit: Yes   Status: Acute   





(6) Substance induced mood disorder


Current Visit: Yes   Status: Suspected   





(7) Nicotine dependence


Current Visit: Yes   Status: Acute   


Qualifiers: 


   Nicotine product type: cigarettes   Substance use status: in withdrawal   

Qualified Code(s): F17.213 - Nicotine dependence, cigarettes, with withdrawal   





- AMA


Did Patient Leave Against Medical Advice: Yes

## 2019-12-20 ENCOUNTER — HOSPITAL ENCOUNTER (INPATIENT)
Dept: HOSPITAL 74 - YASAS | Age: 59
LOS: 2 days | Discharge: LEFT BEFORE DISCHARGE | DRG: 770 | End: 2019-12-22
Attending: ALLERGY & IMMUNOLOGY | Admitting: ALLERGY & IMMUNOLOGY
Payer: COMMERCIAL

## 2019-12-20 VITALS — BODY MASS INDEX: 29.5 KG/M2

## 2019-12-20 DIAGNOSIS — F14.20: ICD-10-CM

## 2019-12-20 DIAGNOSIS — B35.3: ICD-10-CM

## 2019-12-20 DIAGNOSIS — B18.2: ICD-10-CM

## 2019-12-20 DIAGNOSIS — R73.03: ICD-10-CM

## 2019-12-20 DIAGNOSIS — Z56.0: ICD-10-CM

## 2019-12-20 DIAGNOSIS — F11.23: Primary | ICD-10-CM

## 2019-12-20 DIAGNOSIS — F10.230: ICD-10-CM

## 2019-12-20 DIAGNOSIS — F17.210: ICD-10-CM

## 2019-12-20 DIAGNOSIS — R94.5: ICD-10-CM

## 2019-12-20 DIAGNOSIS — I10: ICD-10-CM

## 2019-12-20 DIAGNOSIS — L30.9: ICD-10-CM

## 2019-12-20 DIAGNOSIS — G62.9: ICD-10-CM

## 2019-12-20 DIAGNOSIS — F12.20: ICD-10-CM

## 2019-12-20 LAB
ALBUMIN SERPL-MCNC: 3.7 G/DL (ref 3.4–5)
ALP SERPL-CCNC: 63 U/L (ref 45–117)
ALT SERPL-CCNC: 63 U/L (ref 13–61)
ANION GAP SERPL CALC-SCNC: 10 MMOL/L (ref 8–16)
AST SERPL-CCNC: 59 U/L (ref 15–37)
BILIRUB SERPL-MCNC: 0.7 MG/DL (ref 0.2–1)
BUN SERPL-MCNC: 9 MG/DL (ref 7–18)
CALCIUM SERPL-MCNC: 9.6 MG/DL (ref 8.5–10.1)
CHLORIDE SERPL-SCNC: 102 MMOL/L (ref 98–107)
CO2 SERPL-SCNC: 27 MMOL/L (ref 21–32)
CREAT SERPL-MCNC: 0.7 MG/DL (ref 0.55–1.3)
DEPRECATED RDW RBC AUTO: 14.8 % (ref 11.9–15.9)
GLUCOSE SERPL-MCNC: 113 MG/DL (ref 74–106)
HCT VFR BLD CALC: 44.9 % (ref 35.4–49)
HGB BLD-MCNC: 14.7 GM/DL (ref 11.7–16.9)
MCH RBC QN AUTO: 28.3 PG (ref 25.7–33.7)
MCHC RBC AUTO-ENTMCNC: 32.7 G/DL (ref 32–35.9)
MCV RBC: 86.5 FL (ref 80–96)
PLATELET # BLD AUTO: 190 K/MM3 (ref 134–434)
PMV BLD: 10.2 FL (ref 7.5–11.1)
POTASSIUM SERPLBLD-SCNC: 4 MMOL/L (ref 3.5–5.1)
PROT SERPL-MCNC: 7.8 G/DL (ref 6.4–8.2)
RBC # BLD AUTO: 5.19 M/MM3 (ref 4–5.6)
SODIUM SERPL-SCNC: 138 MMOL/L (ref 136–145)
WBC # BLD AUTO: 5.4 K/MM3 (ref 4–10)

## 2019-12-20 PROCEDURE — HZ2ZZZZ DETOXIFICATION SERVICES FOR SUBSTANCE ABUSE TREATMENT: ICD-10-PCS | Performed by: ALLERGY & IMMUNOLOGY

## 2019-12-20 RX ADMIN — NICOTINE SCH MG: 21 PATCH TRANSDERMAL at 12:32

## 2019-12-20 RX ADMIN — GABAPENTIN SCH MG: 300 CAPSULE ORAL at 22:03

## 2019-12-20 RX ADMIN — Medication SCH MG: at 22:03

## 2019-12-20 SDOH — ECONOMIC STABILITY - INCOME SECURITY: UNEMPLOYMENT, UNSPECIFIED: Z56.0

## 2019-12-20 NOTE — HP
COWS





- Scale


Resting Pulse: 0= NE 80 or Below


Sweatin= Chills/Flushing


Restless Observation: 1= Difficult to Sit Still


Pupil Size: 1= Pupils >than Normal


Bone or Joint Aches: 1= Mild Discomfort


Runny Nose/ Eye Tearin= Nasal Congestion


GI Upset > 30mins: 3= Vomiting/Diarrhea


Tremor Observation: 2= Slight Tremor Visible


Yawning Observation: 1= 1-2x During Session


Anxiety or Irritability: 2=Irritable/Anxious


Goose Flesh Skin: 0=Smooth Skin


COWS Score: 13





CIWA Score


Nausea/Vomitin


Muscle Tremors: 2


Anxiety: 3


Agitation: 3


Paroxysmal Sweats: 1-Minimal Palms Moist


Orientation: 0-Oriented


Tacttile Disturbances: 1-Very Mild Itch/Numbness


Auditory Disturbances: 0-None


Visual Disturbances: 0-None


Headache: 2-Mild


CIWA-Ar Total Score: 14





- Admission Criteria


[facility] Guidelines: Admission for Medically Managed Detox: 


Requires at least one of the followin. CIWA greater than 12


2. Seizures within the past 24 hours


3. Delirium tremens within the past 24 hours


4. Hallucinations within the past 24 hours


5. Acute intervention needed for co  occurring medical disorder


6. Acute intervention needed for co  occurring psychiatric disorder


7. Severe withdrawal that cannot be handled at a lower level of care (continued


    vomiting, continued diarrhea, abnormal vital signs) requiring intravenous


    medication and/or fluids


8. Pregnancy








Admitting History and Physical





- Admission


Chief Complaint: i need help to stop using heroin and alcohol


History of Present Illness: 





this 59 years old male with heroin and alcohol dependence seeking detox


multiple admissions in detox,last [facility] 10/19/19 to 10/13/19 not completed


denied seizure


syncope


hepatitis c


nicotine dependence


no significant period of sobriety


depression no med,did not eant to see psychiatrist


plan for our patient


live with cousin


unemployed


History Source: Patient


Limitations to Obtaining History: No Limitations





- Past Medical History


CNS: Yes: Syncope


Hepatobiliary: Yes: Hepatitis C


Psych: Yes: Depression





- Smoking History


Smoking history: Current every day smoker


Have you smoked in the past 12 months: Yes


Aproximately how many cigarettes per day: 20





- Alcohol/Substance Use


Hx Alcohol Use: Yes


History of Substance Use: reports: Cocaine, Heroin, Marijuana





- Social History


Usual Living Arrangement: Yes: Other (with cousin)


History of Recent Travel: No


Other Social History: this 59 years old male with heroin,alcohol dependence and 

cocaine and marijuana abused,nicotine dependence,unemployed.  for inpatient 

detox





Admission ROS S





- HPI


Chief Complaint: 





i need help to stop using heroin and alcohol,cocaine and marijuana abused


Allergies/Adverse Reactions: 


 Allergies











Allergy/AdvReac Type Severity Reaction Status Date / Time


 


No Known Allergies Allergy   Verified 10/29/19 12:15











History of Present Illness: 





this 59 years old male with heroin,alcohol dependence,cocaine and marijuana 

abused,seeking detox,


withdrawal symptom


last detox 10/29/19 to 10/31/19 not completed


hepatitis c


neuropathy


no significant period of sobriety


borderlined dm


syncope





Exam Limitations: No Limitations





- Ebola screening


Have you traveled outside of the country in the last 21 days: No (N)


Have you had contact with anyone from an Ebola affected area: No


Do you have a fever: No





- Review of Systems


Constitutional: Loss of Appetite, Malaise, Night Sweats, Changes in sleep, 

Weakness


EENT: reports: Tearing, Nose Congestion


Respiratory: reports: No Symptoms reported


Cardiac: reports: No Symptoms Reported


GI: reports: Nausea, Poor Appetite, Abdominal cramping


: reports: No Symptoms Reported


Musculoskeletal: reports: Back Pain, Muscle Pain


Integumentary: reports: Dryness


Neuro: reports: Headache, Tremors


Endocrine: reports: No Symptoms Reported


Hematology: reports: No Symptoms Reported


Psychiatric: reports: No Sypmtoms Reported, Judgement Intact, Mood/Affect 

Appropiate, Orientated x3, Depressed


Other Systems: Reviewed and Negative





Patient History





- Patient Medical History


Hx Anemia: No


Hx Asthma: No


Hx Chronic Obstructive Pulmonary Disease (COPD): No


Hx Cancer: No


Hx Cardiac Disorders: No


Hx Congestive Heart Failure: No


Hx Hypertension: No


Hx Hypercholesterolemia: No


Hx Pacemaker: No


HX Cerebrovascular Accident: No


Hx Seizures: No


Hx Dementia: No


Hx Diabetes: Yes (pre diabetes)


Hx Gastrointestinal Disorders: No


Hx Liver Disease: No


Hx Genitourinary Disorders: No


Hx Sexually Transmitted Disorders: No


Hx Renal Disease (ESRD): No


Hx Thyroid Disease: No


Hx Human Immunodeficiency Virus (HIV): No (negative last )


Hx Hepatitis C: Yes (since  - never treated)


Hx Depression: Yes (no meds)


Hx Suicide Attempt: No (denies (everday I use drugs it's like suicide))


Hx Bipolar Disorder: No


Hx Schizophrenia: No


Other Medical History: no suicidal





- Patient Surgical History


Past Surgical History: Yes


Hx Neurologic Surgery: No


Hx Cataract Extraction: No


Hx Cardiac Surgery: No


Hx Lung Surgery: No


Hx Breast Surgery: No


Hx Breast Biopsy: No


Hx Abdominal Surgery: Yes (right inguinal hernia in )


Hx Appendectomy: No


Hx Cholecystectomy: No


Hx Genitourinary Surgery: No


Hx  Section: No


Hx Orthopedic Surgery: No


Other Surgical History: right inguinal hernia repaqir in 


Anesthesia Reaction: No





- PPD History


Previous Implant?: Yes


Documented Results: Negative w/o proof


Date: 09/10/18


Results: 0 mm


PPD to be Administered?: Yes





- Smoking Cessation


Smoking history: Current every day smoker


Have you smoked in the past 12 months: Yes


Aproximately how many cigarettes per day: 20


Hx Chewing Tobacco Use: No


Initiated information on smoking cessation: Yes


'Breaking Loose' booklet given: 19





- Substance & Tx. History


Hx Alcohol Use: Yes


Hx Substance Use: Yes


Substance Use Type: Alcohol, Cocaine, Heroin, Marijuana


Hx Substance Use Treatment: Yes ([facility] 10/29/19 10/31/19 not completed)





- Substances abused


  ** Alcohol


Substance route: Oral


Frequency: Daily


Amount used: 1.5 pint of Vodka, 6 of 16 oz beer


Age of first use: 14


Date of last use: 19





  ** Heroin


Substance route: Inhalation


Frequency: Daily


Amount used: 10 bags/daily


Age of first use: 14


Date of last use: 19





  ** Cocaine


Substance route: Inhalation


Frequency: 1-3 times last 30 days


Amount used: 10$


Age of first use: 13


Date of last use: 19





  ** Marijuana/Hashish


Substance route: Smoking


Frequency: 1-3 times last 30 days


Amount used: 10$


Age of first use: 14


Date of last use: 19





Admission Physical Exam [facility]





- Vital Signs


Vital Signs: 


 Vital Signs - 24 hr











  19





  10:03


 


Temperature 98.6 F


 


Pulse Rate 64


 


Respiratory 18





Rate 


 


Blood Pressure 164/90














- Physical


General Appearance: Yes: Moderate Distress, Tremorous, Irritable, Sweating


HEENTM: Yes: Normal ENT Inspection, NU, Pharynx Normal


Respiratory: Yes: Lungs Clear, Normal Breath Sounds, No Respiratory Distress


Neck: Yes: Within Normal Limits, Supple, Trachea in good position


Breast: Yes: Within Normal Limits


Cardiology: Yes: Within Normal Limits, Regular Rhythm, Regular Rate, S1, S2


Abdominal: Yes: Within Normal Limits, Normal Bowel Sounds, Non Tender, Soft


Genitourinary: Yes: Within Normal Limits


Back: Yes: Muscle Spasm


Musculoskeletal: Yes: Back pain, Muscle Pain


Extremities: Yes: Tremors


Neurological: Yes: CNs II-XII NML intact, Alert, Motor Strength 5/5


Integumentary: Yes: Dry, Rash, Other (tinea pedis)


Lymphatic: Yes: Within Normal Limits





- Diagnostic


(1) Opioid dependence with withdrawal


Current Visit: No   Status: Acute   





(2) syncope alcohol related


Current Visit: No   Status: Active   





(3) Alcohol dependence with uncomplicated withdrawal


Current Visit: No   Status: Acute   





(4) Cannabis dependence


Current Visit: No   Status: Acute   





(5) Cocaine dependence


Current Visit: No   Status: Acute   





(6) Neuropathy


Current Visit: No   Status: Acute   





(7) Hepatitis C


Current Visit: No   Status: Chronic   


Qualifiers: 


   Viral hepatitis chronicity: carrier   Qualified Code(s): B18.2 - Chronic 

viral hepatitis C   





(8) Tinea pedis of both feet


Current Visit: No   Status: Chronic   





(9) s/p right inguinal hernia repair


Current Visit: No   Status: Chronic   





(10) Tinea pedis


Current Visit: Yes   Status: Acute   





(11) Contact dermatitis


Current Visit: Yes   Status: Acute   





Cleared for Admission [facility]





- Detox or Rehab


[facility] Level of Care: Medically Managed (and ativan regimen)


Detox Regimen/Protocol: Methadone (and ativan)





Breathalyzer





- Breathalyzer


Breathalyzer: 0.003





Urine Drug Screen





- Test Device


Lot number: [id number]


Expiration date: 21





- Control


Is test valid?: Yes





- Results


Drug screen NEGATIVE: No


Urine drug screen results: THC-Marijuana, JULIANNA-Cocaine, FEN-Fentanyl, MOP-Opiates

, BZO-Benzodiazepines





Inpatient Rehab Admission





- Rehab Decision to Admit


Inpatient rehab admission?: No

## 2019-12-21 RX ADMIN — GABAPENTIN SCH MG: 300 CAPSULE ORAL at 10:07

## 2019-12-21 RX ADMIN — GABAPENTIN SCH MG: 300 CAPSULE ORAL at 22:16

## 2019-12-21 RX ADMIN — AMLODIPINE BESYLATE SCH MG: 5 TABLET ORAL at 12:51

## 2019-12-21 RX ADMIN — Medication SCH TAB: at 10:07

## 2019-12-21 RX ADMIN — NICOTINE SCH MG: 21 PATCH TRANSDERMAL at 10:07

## 2019-12-21 RX ADMIN — HYDROCHLOROTHIAZIDE SCH MG: 25 TABLET ORAL at 12:51

## 2019-12-21 RX ADMIN — Medication SCH MG: at 22:16

## 2019-12-21 NOTE — PN
[facility] CIWA





- CIWA Score


Nausea/Vomitin-Mild Nausea/No Vomiting


Muscle Tremors: 2


Anxiety: 2


Agitation: 2


Paroxysmal Sweats: 1-Minimal Palms Moist


Orientation: 0-Oriented


Tacttile Disturbances: 0-None


Auditory Disturbances: 0-None


Visual Disturbances: 0-None


Headache: 1-Very Mild


CIWA-Ar Total Score: 9





[facility] COWS





- Scale


Resting Pulse: 1= [place] 


Sweatin= Chills/Flushing


Restless Observation: 3= Extraneous Movement


Pupil Size: 1= Pupils >than Normal


Bone or Joint Aches: 4=Acute Joint/Muscle Pain


Runny Nose/ Eye Tearin= Runny Nose/Eyes


GI Upset > 30mins: 1= Stomach Cramp


Tremor Observation of Outstretched Hands: 1= Tremor Felt, Not Seen


Yawning Observation: 0= None


Anxiety or Irritability: 2=Irritable/Anxious


Goose Flesh Skin: 0=Smooth Skin


COWS Score: 16





[facility] Progress Note (SOAP)


Subjective: 





Pt admitted for alcohol and heroin detox





O:


 Vital Signs - 24 hr











  19





  12:14 13:31 18:22


 


Temperature 95.5 F L 97.5 F L 98.3 F


 


Pulse Rate 61 61 61


 


Respiratory   17





Rate   


 


Blood Pressure 153/97 153/97 136/96














  19





  20:45 00:30 03:30


 


Temperature 98.2 F  


 


Pulse Rate 63  


 


Respiratory 16 18 18





Rate   


 


Blood Pressure 143/96  














  19





  06:54 09:38


 


Temperature 97.9 F 98.4 F


 


Pulse Rate 60 59 L


 


Respiratory 18 18





Rate  


 


Blood Pressure 168/79 151/105 H








 Laboratory Tests











  19





  11:17 11:40 11:40


 


WBC   5.4 


 


RBC   5.19 


 


Hgb   14.7 


 


Hct   44.9 


 


MCV   86.5 


 


MCH   28.3 


 


MCHC   32.7 


 


RDW   14.8 


 


Plt Count   190 


 


MPV   10.2 


 


Sodium    138


 


Potassium    4.0


 


Chloride    102


 


Carbon Dioxide    27


 


Anion Gap    10


 


BUN    9.0


 


Creatinine    0.7


 


Est GFR (CKD-EPI)AfAm    118.05


 


Est GFR (CKD-EPI)NonAf    101.85


 


POC Glucometer  109  


 


Random Glucose    113 H


 


Calcium    9.6


 


Total Bilirubin    0.7


 


AST    59 H


 


ALT    63 H


 


Alkaline Phosphatase    63


 


Total Protein    7.8


 


Albumin    3.7














  19





  06:52


 


WBC 


 


RBC 


 


Hgb 


 


Hct 


 


MCV 


 


MCH 


 


MCHC 


 


RDW 


 


Plt Count 


 


MPV 


 


Sodium 


 


Potassium 


 


Chloride 


 


Carbon Dioxide 


 


Anion Gap 


 


BUN 


 


Creatinine 


 


Est GFR (CKD-EPI)AfAm 


 


Est GFR (CKD-EPI)NonAf 


 


POC Glucometer  135


 


Random Glucose 


 


Calcium 


 


Total Bilirubin 


 


AST 


 


ALT 


 


Alkaline Phosphatase 


 


Total Protein 


 


Albumin 








increased LFT's


Increased BP:





a/p: continue detox protocols for alcohol and heroin


HTN- pt not taking meds at home. will start nydrochlorothizide and norvasc

## 2019-12-22 VITALS — SYSTOLIC BLOOD PRESSURE: 138 MMHG | DIASTOLIC BLOOD PRESSURE: 103 MMHG | HEART RATE: 86 BPM

## 2019-12-22 RX ADMIN — NICOTINE SCH: 21 PATCH TRANSDERMAL at 11:03

## 2019-12-22 RX ADMIN — Medication SCH: at 11:03

## 2019-12-22 RX ADMIN — GABAPENTIN SCH: 300 CAPSULE ORAL at 11:03

## 2019-12-22 RX ADMIN — HYDROCHLOROTHIAZIDE SCH: 25 TABLET ORAL at 11:02

## 2019-12-22 RX ADMIN — AMLODIPINE BESYLATE SCH: 5 TABLET ORAL at 11:03

## 2019-12-22 NOTE — DS
[facility] Detox Discharge Summary


Admission Date: 


12/20/19





Discharge Date: 12/22/19 (Left AMA)





- History


Present History: Alcohol Dependence, Cannabis Dependence, Cocaine Dependence, 

Opioid Dependence


Additional Comments: 





Pt left AMA. Pt did not complete the detox protocol. Pt states, "i have things 

to take care of". An attempt to let pt stay and complete the detox protocol 

failed. Pt is encouraged to follow-up with an outpatient CD program and also to 

follow-up with his pmd. Pt was adamant about the information given. Pt is alert 

and oriented x3 and in no acute respiratory distress.


Pertinent Past History: 





h/o alcohol, heroin, cocaine, and cannabis use disorder.





- Physical Exam Results


Vital Signs: 


 Vital Signs











Temperature  9802 F H  12/22/19 09:46


 


Pulse Rate  86   12/22/19 09:46


 


Respiratory Rate  19   12/22/19 09:46


 


Blood Pressure  138/103 H  12/22/19 09:46


 


O2 Sat by Pulse Oximetry (%)      








 Vital Signs











  12/22/19





  09:46


 


Temperature 9802 F H


 


Pulse Rate 86


 


Respiratory 19





Rate 


 


Blood Pressure 138/103 H








 Laboratory Last Values











WBC  5.4 K/mm3 (4.0-10.0)   12/20/19  11:40    


 


RBC  5.19 M/mm3 (4.00-5.60)   12/20/19  11:40    


 


Hgb  14.7 GM/dL (11.7-16.9)   12/20/19  11:40    


 


Hct  44.9 % (35.4-49)   12/20/19  11:40    


 


MCV  86.5 fl (80-96)   12/20/19  11:40    


 


MCH  28.3 pg (25.7-33.7)   12/20/19  11:40    


 


MCHC  32.7 g/dl (32.0-35.9)   12/20/19  11:40    


 


RDW  14.8 % (11.9-15.9)   12/20/19  11:40    


 


Plt Count  190 K/MM3 (134-434)   12/20/19  11:40    


 


MPV  10.2 fl (7.5-11.1)   12/20/19  11:40    


 


Sodium  138 mmol/L (136-145)   12/20/19  11:40    


 


Potassium  4.0 mmol/L (3.5-5.1)   12/20/19  11:40    


 


Chloride  102 mmol/L ()   12/20/19  11:40    


 


Carbon Dioxide  27 mmol/L (21-32)   12/20/19  11:40    


 


Anion Gap  10 MMOL/L (8-16)   12/20/19  11:40    


 


BUN  9.0 mg/dL (7-18)   12/20/19  11:40    


 


Creatinine  0.7 mg/dL (0.55-1.3)   12/20/19  11:40    


 


Est GFR (CKD-EPI)AfAm  118.05   12/20/19  11:40    


 


Est GFR (CKD-EPI)NonAf  101.85   12/20/19  11:40    


 


POC Glucometer  135 UNITS ()   12/22/19  05:27    


 


Random Glucose  113 mg/dL ()  H  12/20/19  11:40    


 


Calcium  9.6 mg/dL (8.5-10.1)   12/20/19  11:40    


 


Total Bilirubin  0.7 mg/dL (0.2-1)   12/20/19  11:40    


 


AST  59 U/L (15-37)  H  12/20/19  11:40    


 


ALT  63 U/L (13-61)  H  12/20/19  11:40    


 


Alkaline Phosphatase  63 U/L ()   12/20/19  11:40    


 


Total Protein  7.8 g/dl (6.4-8.2)   12/20/19  11:40    


 


Albumin  3.7 g/dl (3.4-5.0)   12/20/19  11:40    


 


RPR Titer  Nonreactive  (NONREACTIVE)   12/20/19  11:40    








Labs noted.


Pertinent Admission Physical Exam Findings: 





withdrawal symptoms.





- Treatment


Hospital Course: Detox Protocol Followed





- Medication


Discharge Medications: 


Ambulatory Orders





Gabapentin [Neurontin] 300 mg PO BID 10/29/19 











- Diagnosis


(1) Alcohol dependence with uncomplicated withdrawal


Status: Acute   





(2) Cannabis dependence


Status: Acute   





(3) Cocaine dependence


Status: Acute   





(4) Contact dermatitis


Status: Acute   





(5) Tinea pedis


Status: Acute   





(6) marijuana dependence


Status: Acute   





(7) History of prediabetes


Status: Chronic   





(8) Hypertension


Status: Chronic   


Qualifiers: 


   Hypertension type: essential hypertension   Qualified Code(s): I10 - 

Essential (primary) hypertension   





(9) Tinea pedis of both feet


Status: Chronic   





(10) s/p right inguinal hernia repair


Status: Chronic   





- AMA


Did Patient Leave Against Medical Advice: Yes

## 2020-10-03 ENCOUNTER — HOSPITAL ENCOUNTER (INPATIENT)
Dept: HOSPITAL 74 - YASAS | Age: 60
LOS: 1 days | Discharge: LEFT BEFORE DISCHARGE | DRG: 770 | End: 2020-10-04
Attending: ALLERGY & IMMUNOLOGY | Admitting: ALLERGY & IMMUNOLOGY
Payer: COMMERCIAL

## 2020-10-03 VITALS — BODY MASS INDEX: 29.5 KG/M2

## 2020-10-03 DIAGNOSIS — E88.09: ICD-10-CM

## 2020-10-03 DIAGNOSIS — R03.0: ICD-10-CM

## 2020-10-03 DIAGNOSIS — F17.210: ICD-10-CM

## 2020-10-03 DIAGNOSIS — F12.20: ICD-10-CM

## 2020-10-03 DIAGNOSIS — R74.01: ICD-10-CM

## 2020-10-03 DIAGNOSIS — F11.23: ICD-10-CM

## 2020-10-03 DIAGNOSIS — B18.2: ICD-10-CM

## 2020-10-03 DIAGNOSIS — F32.9: ICD-10-CM

## 2020-10-03 DIAGNOSIS — R73.03: ICD-10-CM

## 2020-10-03 DIAGNOSIS — F10.230: Primary | ICD-10-CM

## 2020-10-03 PROCEDURE — HZ2ZZZZ DETOXIFICATION SERVICES FOR SUBSTANCE ABUSE TREATMENT: ICD-10-PCS | Performed by: ALLERGY & IMMUNOLOGY

## 2020-10-03 PROCEDURE — U0003 INFECTIOUS AGENT DETECTION BY NUCLEIC ACID (DNA OR RNA); SEVERE ACUTE RESPIRATORY SYNDROME CORONAVIRUS 2 (SARS-COV-2) (CORONAVIRUS DISEASE [COVID-19]), AMPLIFIED PROBE TECHNIQUE, MAKING USE OF HIGH THROUGHPUT TECHNOLOGIES AS DESCRIBED BY CMS-2020-01-R: HCPCS

## 2020-10-03 RX ADMIN — GABAPENTIN SCH MG: 300 CAPSULE ORAL at 11:51

## 2020-10-03 RX ADMIN — GABAPENTIN SCH MG: 300 CAPSULE ORAL at 22:12

## 2020-10-03 NOTE — BHS.RME
Substance Use & Tx History





- Substance Use History


  ** Heroin


Substance amount: 7 bags


Frequency of use: Daily


Substance route: Inhalation (ex: sniffing or snorting)


Date of Last Use: 10/03/20





  ** Marijuana/Hashish


Substance amount: 1 joint


Frequency of use: Daily


Substance route: Smoking


Date of Last Use: 10/02/20





  ** Alcohol


Substance amount: 6-7 cans of beer, 1.5-2 pints liqour


Frequency of use: Daily


Substance route: Oral


Date of Last Use: 10/03/20





- Last Treatment


Date of last treatment: 19


Where was last treatment: Detox





Physical/Psych/Mental Status





- Behavior


General Behavior: Increased activity (restlessness, agitation)


Eye Contact: Normal


Other Behaviors: Mannerisms





- Cooperativeness


Cooperativeness: Cooperative





- Thinking


Thought Processes: Tight, Logical





- Physical Health Problems


Is patient presently having any pain?: No


Does patient presently have any injuries (include location): No


Does patient currently have a fever: No





COWS





- Scale


Resting Pulse: 0= [place] 80 or Below


Sweatin= Chills/Flushing


Restless Observation: 1= Difficult to Sit Still


Pupil Size: 1= Pupils >than Normal


Bone or Joint Aches: 2= Severe Diffuse Aches


Runny Nose/ Eye Tearin= Nasal Congestion


GI Upset > 30mins: 2= Nausea/Diarrhea


Tremor Observation: 2= Slight Tremor Visible


Yawning Observation: 1= 1-2x During Session


Anxiety or Irritability: 2=Irritable/Anxious


Goose Flesh Skin: 3=Piloerection


COWS Score: 16





CIWA


Nausea/Vomitin


Muscle Tremors: 2


Anxiety: 2


Agitation: 2


Paroxysmal Sweats: 1-Minimal Palms Moist


Orientation: 0-Oriented


Tacttile Disturbances: 2-Mild Itch/Numbness/Burn


Auditory Disturbances: 0-None


Visual Disturbances: 0-None


Headache: 2-Mild


CIWA-Ar Total Score: 13

## 2020-10-03 NOTE — XMS
Summarization Of Episode

                           Created on:October 3, 2020



Patient:JOVANNY STONE

Sex:Male

:1960

External Reference #:[id number]





Demographics







                          Address                   [address]



                                                    [place] [address]

 

                          Home Phone                [phone]

 

                          Email Address             N

 

                          Preferred Language        English

 

                          Marital Status            Not  or 

 

                          Latter-day Affiliation     NO

 

                          Race                      BL

 

                          Ethnic Group              Not  or 









Author







                          Organization              [facility]









Support







                Name            Relationship    Address         Phone

 

                UE              Unavailable     Unavailable     Unavailable

 

                JEY REYESSIN          [phone] [phone]



                                                [place] [address] 









Re-disclosure Warning

The records that you are about to access may contain information from federally-
assisted alcohol or drug abuse programs. If such information is present, then 
the following federally mandated warning applies: This information has been 
disclosed to you from records protected by federal confidentiality rules (42 CFR
part 2). The federal rules prohibit you from making any further disclosure of 
this information unless further disclosure is expressly permitted by the written
consent of the person to whom it pertains or as otherwise permitted by 42 CFR 
part 2. A general authorization for the release of medical or other information 
is NOT sufficient for this purpose. The Federal rules restrict any use of the 
information to criminally investigate or prosecute any alcohol or drug abuse 
patient.The records that you are about to access may contain highly sensitive 
health information, the redisclosure of which is protected by Article 27-F of 
the [facility] Public Health law. If you continue you may haveaccess to 
information: Regarding HIV / AIDS; Provided by facilities licensed or operated 
by the [facility] Office of Mental Health; or Provided by the [facility]
Office for People With Developmental Disabilities. If such information is 
present, then the following [place] mandated warning applies: This 
information has been disclosed to you from confidential records which are 
protected by state law. State law prohibits you from making any further 
disclosure of this information without the specific written consent of the 
person to whom it pertains, or as otherwise permitted by law. Any unauthorized 
further disclosure in violation of state law may result in a fine or prison 
sentence or both. A general authorization for the release of medical or other 
information is NOT sufficient authorization for further disclosure.



Insurance Providers







          Payer name Policy type Policy ID Covered   Covered party's Policy    P

laureano



                    / Coverage           party ID  relationship to [employer]    Inf

ormation



                    type                          beasley              

 

          BEACON              [id number]            SP                  [id number]



          [facility]

## 2020-10-03 NOTE — XMS
Summarization Of Episode

                           Created on:October 3, 2020



Patient:JOVANNY STONE

Sex:Male

:1960

External Reference #:[id number]





Demographics







                          Address                   [address]



                                                    [place] [address]

 

                          Home Phone                [phone]

 

                          Email Address             N

 

                          Preferred Language        English

 

                          Marital Status            Not  or 

 

                          Jewish Affiliation     NO

 

                          Race                      BL

 

                          Ethnic Group              Not  or 









Author







                          Organization              [facility]









Support







                Name            Relationship    Address         Phone

 

                UE              Unavailable     Unavailable     Unavailable

 

                JEY REYESSIN          [phone]



                                                [place] [address] 









Re-disclosure Warning

The records that you are about to access may contain information from federally-
assisted alcohol or drug abuse programs. If such information is present, then 
the following federally mandated warning applies: This information has been 
disclosed to you from records protected by federal confidentiality rules (42 CFR
part 2). The federal rules prohibit you from making any further disclosure of 
this information unless further disclosure is expressly permitted by the written
consent of the person to whom it pertains or as otherwise permitted by 42 CFR 
part 2. A general authorization for the release of medical or other information 
is NOT sufficient for this purpose. The Federal rules restrict any use of the 
information to criminally investigate or prosecute any alcohol or drug abuse 
patient.The records that you are about to access may contain highly sensitive 
health information, the redisclosure of which is protected by Article 27-F of 
the [facility] Public Health law. If you continue you may haveaccess to 
information: Regarding HIV / AIDS; Provided by facilities licensed or operated 
by the [facility] Office of Mental Health; or Provided by the [facility]
Office for People With Developmental Disabilities. If such information is 
present, then the following [place] mandated warning applies: This 
information has been disclosed to you from confidential records which are 
protected by state law. State law prohibits you from making any further 
disclosure of this information without the specific written consent of the 
person to whom it pertains, or as otherwise permitted by law. Any unauthorized 
further disclosure in violation of state law may result in a fine or assisted 
sentence or both. A general authorization for the release of medical or other 
information is NOT sufficient authorization for further disclosure.



Insurance Providers







          Payer name Policy type Policy ID Covered   Covered party's Policy    P

laureano



                    / Coverage           party ID  relationship to Beasley    Inf

ormation



                    type                          beasley              

 

          BEACON              [id number]            SP                  [id number]



          [facility]

## 2020-10-03 NOTE — HP
COWS





- Scale


Resting Pulse: 0= [place] 80 or Below


Sweatin= Chills/Flushing


Restless Observation: 1= Difficult to Sit Still


Pupil Size: 1= Pupils >than Normal


Bone or Joint Aches: 2= Severe Diffuse Aches


Runny Nose/ Eye Tearin= Nasal Congestion


GI Upset > 30mins: 2= Nausea/Diarrhea


Tremor Observation: 2= Slight Tremor Visible


Yawning Observation: 1= 1-2x During Session


Anxiety or Irritability: 2=Irritable/Anxious


Goose Flesh Skin: 3=Piloerection


COWS Score: 16





CIWA Score


Nausea/Vomitin


Muscle Tremors: 2


Anxiety: 2


Agitation: 2


Paroxysmal Sweats: 1-Minimal Palms Moist


Orientation: 0-Oriented


Tacttile Disturbances: 2-Mild Itch/Numbness/Burn


Auditory Disturbances: 0-None


Visual Disturbances: 0-None


Headache: 2-Mild


CIWA-Ar Total Score: 13





- Admission Criteria


[facility] Guidelines: Admission for Medically Managed Detox: 


Requires at least one of the followin. CIWA greater than 12


2. Seizures within the past 24 hours


3. Delirium tremens within the past 24 hours


4. Hallucinations within the past 24 hours


5. Acute intervention needed for co  occurring medical disorder


6. Acute intervention needed for co  occurring psychiatric disorder


7. Severe withdrawal that cannot be handled at a lower level of care (continued


    vomiting, continued diarrhea, abnormal vital signs) requiring intravenous


    medication and/or fluids


8. Pregnancy





Patient presents the following: CIWA greater than 12


Admission Criteria Met: Admission criteria met





Admitting History and Physical





- Past Medical History


CNS: Yes: Syncope


Hepatobiliary: Yes: Hepatitis C


Psych: Yes: Depression





- Smoking History


Smoking history: Current every day smoker


Have you smoked in the past 12 months: Yes


Aproximately how many cigarettes per day: 20





- Alcohol/Substance Use


Hx Alcohol Use: Yes


History of Substance Use: reports: Cocaine, Heroin, Marijuana





- Social History


History of Recent Travel: No





Admission ROS [facility]





- [facility]


Chief Complaint: 





I am here for detox from heroin and alcohol.


Allergies/Adverse Reactions: 


                                    Allergies











Allergy/AdvReac Type Severity Reaction Status Date / Time


 


No Known Allergies Allergy   Verified 10/03/20 10:36











History of Present Illness: 





Patient is a 60 years old man who presents for alcohol and heroin detox. Patient

was previously on methadone maintenance program but he abandoned the program. He

reports several blackouts lately, he denies seizures. His last detox treatment 

was from -19, he signed out AMA because he wasn't ready, patient has 

h/o several AMAs. He has been educated on the importance of program completion, 

he has also been informed he will not be admitted to [facility] again if he signs

out AMA this visit He verbalized understanding, seen by counselor, contract 

signed. 


Exam Limitations: No Limitations





- Ebola screening


Have you traveled outside of the country in the last 21 days: No


Have you had contact with anyone from an Ebola affected area: No


Have you been sick,other than usual withdrawal symptoms: No


Do you have a fever: No





- Review of Systems


Constitutional: Chills, Changes in sleep, Weight Stable


EENT: reports: Blurred Vision, Recent change in vision, Nose Congestion


Respiratory: reports: No Symptoms reported


Cardiac: reports: No Symptoms Reported


GI: reports: Nausea, Poor Fluid Intake, Abdominal cramping


: reports: No Symptoms Reported


Musculoskeletal: reports: Back Pain, Joint Pain, Muscle Pain, Muscle Weakness


Integumentary: reports: Sweating


Neuro: reports: Headache, Numbness, Tremors


Endocrine: reports: No Symptoms Reported


Hematology: reports: No Symptoms Reported


Other Systems: Reviewed and Negative





Patient History





- Patient Medical History


Hx Anemia: No


Hx Asthma: No


Hx Chronic Obstructive Pulmonary Disease (COPD): No


Hx Cancer: No


Hx Cardiac Disorders: No


Hx Congestive Heart Failure: No


Hx Hypertension: No


Hx Hypercholesterolemia: No


Hx Pacemaker: No


HX Cerebrovascular Accident: No


Hx Seizures: No


Hx Dementia: No


Hx Diabetes: Yes (borderline)


Hx Gastrointestinal Disorders: No


Hx Liver Disease: No


Hx Genitourinary Disorders: No


Hx Sexually Transmitted Disorders: No


Hx Renal Disease (ESRD): No


Hx Thyroid Disease: No


Hx Human Immunodeficiency Virus (HIV): No


Hx Hepatitis C: Yes (since  - not treated)


Hx Depression: Yes


Hx Suicide Attempt: No


Hx Bipolar Disorder: No


Hx Schizophrenia: No





- Patient Surgical History


Past Surgical History: Yes


Hx Neurologic Surgery: No


Hx Cataract Extraction: No


Hx Cardiac Surgery: No


Hx Lung Surgery: No


Hx Breast Surgery: No


Hx Breast Biopsy: No


Hx Abdominal Surgery: Yes (right inguinal hernia in )


Hx Appendectomy: No


Hx Cholecystectomy: No


Hx Genitourinary Surgery: No


Hx  Section: No


Hx Orthopedic Surgery: No


Anesthesia Reaction: No





- PPD History


Previous Implant?: Yes


Documented Results: Negative w/proof


Implanted On Prior [facility] Admission?: Yes


Date: 09/10/18


Results: 0 mm


PPD to be Administered?: Yes





- Smoking Cessation


Smoking history: Current every day smoker


Have you smoked in the past 12 months: Yes


Aproximately how many cigarettes per day: 20


Hx Chewing Tobacco Use: No


Initiated information on smoking cessation: Yes


'Breaking Loose' booklet given: 10/03/20





- Substance & Tx. History


Hx Alcohol Use: Yes


Hx Substance Use: Yes


Substance Use Type: Alcohol, Heroin, Marijuana


Hx Substance Use Treatment: Yes





- Substances abused


  ** Alcohol


Other (specify): Liqour, beer


Substance route: Oral


Frequency: Daily


Amount used: 1.5-2 pints, 6-7 cans


Age of first use: 14


Date of last use: 10/03/20





  ** Heroin


Frequency: Daily


Amount used: 7 bags


Age of first use: 14


Date of last use: 10/03/20





  ** Marijuana/Hashish


Substance route: Smoking


Frequency: Daily


Amount used: 1 joint


Age of first use: 14


Date of last use: 10/02/20





Admission Physical Exam S





- Physical


General Appearance: Yes: No Apparent Distress, Sweating


HEENTM: Yes: Normocephalic, Nasal Congestion


Respiratory: Yes: Chest Non-Tender, Lungs Clear, No Respiratory Distress, No 

Accessory Muscle Use


Neck: Yes: No masses,lesions,Nodules, Supple


Breast: Yes: Breast Exam Deferred


Cardiology: Yes: Regular Rhythm, Regular Rate, S1, S2


Abdominal: Yes: Normal Bowel Sounds, Non Tender, Soft


Genitourinary: Yes: Within Normal Limits


Back: Yes: Normal Inspection


Musculoskeletal: Yes: full range of Motion, Gait Steady, Back pain, Muscle 

weakness


Extremities: Yes: Tremors


Neurological: Yes: CNs II-XII NML intact, Alert, Normal Mood/Affect, Normal Resp

onse


Integumentary: Yes: Clammy


Lymphatic: Yes: Within Normal Limits





- Diagnostic


(1) Alcohol dependence with uncomplicated withdrawal


Current Visit: Yes   Status: Acute   





(2) Nicotine dependence


Current Visit: Yes   Status: Acute   


Qualifiers: 


   Nicotine product type: cigarettes   Substance use status: in withdrawal   

Qualified Code(s): F17.213 - Nicotine dependence, cigarettes, with withdrawal   





(3) Opioid dependence with withdrawal


Current Visit: Yes   Status: Acute   





(4) marijuana dependence


Current Visit: Yes   Status: Acute   





(5) Hepatitis C carrier


Current Visit: Yes   Status: Chronic   





(6) History of prediabetes


Current Visit: Yes   Status: Chronic   





Cleared for Admission [facility]





- Detox or Rehab


[facility] Level of Care: Medically Managed


Detox Regimen/Protocol: Ativan, Methadone


Claeared for Rehab Admission: No





Breathalyzer





- Breathalyzer


Breathalyzer: 0.003





Urine Drug Screen





- Test Device


Lot number: [id number]


Expiration date: 21





- Control


Is test valid?: Yes





- Results


Drug screen NEGATIVE: No


Urine drug screen results: THC-Marijuana, JULIANNA-Cocaine, FEN-Fentanyl, MOP-Opia

da, BZO-Benzodiazepines





Inpatient Rehab Admission





- Rehab Decision to Admit


Inpatient rehab admission?: No

## 2020-10-04 VITALS — HEART RATE: 86 BPM | DIASTOLIC BLOOD PRESSURE: 91 MMHG | SYSTOLIC BLOOD PRESSURE: 127 MMHG

## 2020-10-04 VITALS — TEMPERATURE: 97.3 F

## 2020-10-04 LAB
ALBUMIN SERPL-MCNC: 3 G/DL (ref 3.4–5)
ALP SERPL-CCNC: 103 U/L (ref 45–117)
ALT SERPL-CCNC: 86 U/L (ref 13–61)
ANION GAP SERPL CALC-SCNC: 7 MMOL/L (ref 8–16)
AST SERPL-CCNC: 86 U/L (ref 15–37)
BILIRUB SERPL-MCNC: 0.3 MG/DL (ref 0.2–1)
BUN SERPL-MCNC: 9.4 MG/DL (ref 7–18)
CALCIUM SERPL-MCNC: 8.7 MG/DL (ref 8.5–10.1)
CHLORIDE SERPL-SCNC: 103 MMOL/L (ref 98–107)
CO2 SERPL-SCNC: 28 MMOL/L (ref 21–32)
CREAT SERPL-MCNC: 0.7 MG/DL (ref 0.55–1.3)
DEPRECATED RDW RBC AUTO: 14 % (ref 11.9–15.9)
GLUCOSE SERPL-MCNC: 122 MG/DL (ref 74–106)
HCT VFR BLD CALC: 41.6 % (ref 35.4–49)
HGB BLD-MCNC: 13.7 GM/DL (ref 11.7–16.9)
MCH RBC QN AUTO: 28.4 PG (ref 25.7–33.7)
MCHC RBC AUTO-ENTMCNC: 32.9 G/DL (ref 32–35.9)
MCV RBC: 86.4 FL (ref 80–96)
PLATELET # BLD AUTO: 151 K/MM3 (ref 134–434)
PMV BLD: 10.2 FL (ref 7.5–11.1)
POTASSIUM SERPLBLD-SCNC: 4 MMOL/L (ref 3.5–5.1)
PROT SERPL-MCNC: 6.7 G/DL (ref 6.4–8.2)
RBC # BLD AUTO: 4.81 M/MM3 (ref 4–5.6)
SODIUM SERPL-SCNC: 138 MMOL/L (ref 136–145)
WBC # BLD AUTO: 3.8 K/MM3 (ref 4–10)

## 2020-10-04 RX ADMIN — GABAPENTIN SCH MG: 300 CAPSULE ORAL at 10:53

## 2020-10-04 NOTE — EKG
Test Reason : 

Blood Pressure : ***/*** mmHG

Vent. Rate : 060 BPM     Atrial Rate : 060 BPM

   P-R Int : 164 ms          QRS Dur : 102 ms

    QT Int : 424 ms       P-R-T Axes : 051 -48 026 degrees

   QTc Int : 424 ms

 

NORMAL SINUS RHYTHM

LEFT ANTERIOR FASCICULAR BLOCK

ABNORMAL ECG

WHEN COMPARED WITH ECG OF 10-GERONIMO-2019 10:56,

T WAVE VARIATION

Confirmed by MILENA TREJO MD ([id number]) on 10/4/2020 9:41:33 PM

 

Referred By:             Confirmed By:MILENA TREJO MD

## 2020-10-04 NOTE — DS
[facility] Detox Discharge Summary


Admission Date: 


10/03/20








- History


Present History: Alcohol Dependence, Cannabis Dependence, Opioid Dependence


Additional Comments: 





Patient decided to leave AMA despite encouragement from staff to complete detox.

Patient instructed to call 911 if sick or withdrawal sxs and to see his PCP 

within 3 days. Patient left in stable condition.


Pertinent Past History: 





Prediabetes





HCV





- Physical Exam Results


Vital Signs: 


                                   Vital Signs











Temperature  97.3 F L  10/04/20 12:52


 


Pulse Rate  86   10/04/20 12:52


 


Respiratory Rate  18   10/04/20 12:52


 


Blood Pressure  127/91   10/04/20 12:52


 


O2 Sat by Pulse Oximetry (%)  96   10/04/20 12:52











Pertinent Admission Physical Exam Findings: 





Withdrawal sxs





                                Laboratory Tests











  10/03/20 10/03/20 10/03/20





  10:54 11:00 16:38


 


WBC   


 


RBC   


 


Hgb   


 


Hct   


 


MCV   


 


MCH   


 


MCHC   


 


RDW   


 


Plt Count   


 


MPV   


 


Sodium   


 


Potassium   


 


Chloride   


 


Carbon Dioxide   


 


Anion Gap   


 


BUN   


 


Creatinine   


 


Est GFR (CKD-EPI)AfAm   


 


Est GFR (CKD-EPI)NonAf   


 


POC Glucometer  159   167


 


Random Glucose   


 


Calcium   


 


Total Bilirubin   


 


AST   


 


ALT   


 


Alkaline Phosphatase   


 


Total Protein   


 


Albumin   


 


Syphilis Serology   


 


COVID-19 (SHERRI)   Not detected 














  10/04/20 10/04/20 10/04/20





  07:50 07:50 07:50


 


WBC   3.8 L 


 


RBC   4.81 


 


Hgb   13.7 


 


Hct   41.6 


 


MCV   86.4 


 


MCH   28.4 


 


MCHC   32.9 


 


RDW   14.0 


 


Plt Count   151  D 


 


MPV   10.2 


 


Sodium    138


 


Potassium    4.0


 


Chloride    103


 


Carbon Dioxide    28


 


Anion Gap    7 L


 


BUN    9.4


 


Creatinine    0.7


 


Est GFR (CKD-EPI)AfAm    117.22


 


Est GFR (CKD-EPI)NonAf    101.14


 


POC Glucometer   


 


Random Glucose    122 H


 


Calcium    8.7


 


Total Bilirubin    0.3


 


AST    86 H


 


ALT    86 H


 


Alkaline Phosphatase    103


 


Total Protein    6.7


 


Albumin    3.0 L


 


Syphilis Serology  Non-reactive  


 


COVID-19 (SHERRI)   








Labs reviewed: hyperglycemia, transaminitis and hypoalbuminemia noted: 

instructed to follow up with PCP within 3 days





- Medication


Discharge Medications: 


Ambulatory Orders





Gabapentin [Neurontin] 300 mg PO BID 10/29/19 


Albuterol Sulfate Inhaler - [Ventolin Hfa Inhaler -] 2 inh PO Q6H 10/03/20 











- Diagnosis


(1) depression


Status: Chronic   





(2) Alcohol dependence with uncomplicated withdrawal


Status: Acute   





(3) Cannabis dependence


Status: Acute   





(4) Opioid dependence with withdrawal


Status: Acute   





(5) Hepatitis C carrier


Status: Chronic   





(6) History of prediabetes


Status: Chronic   





(7) Elevated blood pressure reading without diagnosis of hypertension


Status: Acute   





- AMA


Did Patient Leave Against Medical Advice: Yes (Instructed to call 911 stat if 

sick or withdrawal sxs)

## 2020-11-19 ENCOUNTER — HOSPITAL ENCOUNTER (INPATIENT)
Dept: HOSPITAL 74 - YASAS | Age: 60
LOS: 2 days | Discharge: LEFT BEFORE DISCHARGE | DRG: 770 | End: 2020-11-21
Attending: ALLERGY & IMMUNOLOGY | Admitting: ALLERGY & IMMUNOLOGY
Payer: COMMERCIAL

## 2020-11-19 VITALS — BODY MASS INDEX: 29.2 KG/M2

## 2020-11-19 DIAGNOSIS — F17.213: ICD-10-CM

## 2020-11-19 DIAGNOSIS — F10.230: Primary | ICD-10-CM

## 2020-11-19 DIAGNOSIS — F11.23: ICD-10-CM

## 2020-11-19 DIAGNOSIS — G62.9: ICD-10-CM

## 2020-11-19 DIAGNOSIS — F32.9: ICD-10-CM

## 2020-11-19 DIAGNOSIS — B35.3: ICD-10-CM

## 2020-11-19 DIAGNOSIS — F14.20: ICD-10-CM

## 2020-11-19 DIAGNOSIS — F12.20: ICD-10-CM

## 2020-11-19 DIAGNOSIS — Z98.890: ICD-10-CM

## 2020-11-19 DIAGNOSIS — I10: ICD-10-CM

## 2020-11-19 DIAGNOSIS — R74.01: ICD-10-CM

## 2020-11-19 DIAGNOSIS — B18.2: ICD-10-CM

## 2020-11-19 DIAGNOSIS — Z59.0: ICD-10-CM

## 2020-11-19 DIAGNOSIS — Z79.84: ICD-10-CM

## 2020-11-19 DIAGNOSIS — F19.24: ICD-10-CM

## 2020-11-19 DIAGNOSIS — E11.9: ICD-10-CM

## 2020-11-19 DIAGNOSIS — Z56.0: ICD-10-CM

## 2020-11-19 LAB
ALBUMIN SERPL-MCNC: 3.3 G/DL (ref 3.4–5)
ALP SERPL-CCNC: 78 U/L (ref 45–117)
ALT SERPL-CCNC: 85 U/L (ref 13–61)
ANION GAP SERPL CALC-SCNC: 8 MMOL/L (ref 8–16)
AST SERPL-CCNC: 109 U/L (ref 15–37)
BILIRUB SERPL-MCNC: 0.2 MG/DL (ref 0.2–1)
BUN SERPL-MCNC: 10.3 MG/DL (ref 7–18)
CALCIUM SERPL-MCNC: 8.6 MG/DL (ref 8.5–10.1)
CHLORIDE SERPL-SCNC: 106 MMOL/L (ref 98–107)
CO2 SERPL-SCNC: 26 MMOL/L (ref 21–32)
CREAT SERPL-MCNC: 0.8 MG/DL (ref 0.55–1.3)
DEPRECATED RDW RBC AUTO: 14.9 % (ref 11.9–15.9)
GLUCOSE SERPL-MCNC: 93 MG/DL (ref 74–106)
HCT VFR BLD CALC: 41.8 % (ref 35.4–49)
HGB BLD-MCNC: 13.3 GM/DL (ref 11.7–16.9)
MCH RBC QN AUTO: 27.9 PG (ref 25.7–33.7)
MCHC RBC AUTO-ENTMCNC: 31.7 G/DL (ref 32–35.9)
MCV RBC: 88 FL (ref 80–96)
PLATELET # BLD AUTO: 270 K/MM3 (ref 134–434)
PMV BLD: 9.5 FL (ref 7.5–11.1)
POTASSIUM SERPLBLD-SCNC: 3.8 MMOL/L (ref 3.5–5.1)
PROT SERPL-MCNC: 7.4 G/DL (ref 6.4–8.2)
RBC # BLD AUTO: 4.76 M/MM3 (ref 4–5.6)
SODIUM SERPL-SCNC: 140 MMOL/L (ref 136–145)
WBC # BLD AUTO: 5.6 K/MM3 (ref 4–10)

## 2020-11-19 PROCEDURE — U0003 INFECTIOUS AGENT DETECTION BY NUCLEIC ACID (DNA OR RNA); SEVERE ACUTE RESPIRATORY SYNDROME CORONAVIRUS 2 (SARS-COV-2) (CORONAVIRUS DISEASE [COVID-19]), AMPLIFIED PROBE TECHNIQUE, MAKING USE OF HIGH THROUGHPUT TECHNOLOGIES AS DESCRIBED BY CMS-2020-01-R: HCPCS

## 2020-11-19 PROCEDURE — C9803 HOPD COVID-19 SPEC COLLECT: HCPCS

## 2020-11-19 PROCEDURE — HZ2ZZZZ DETOXIFICATION SERVICES FOR SUBSTANCE ABUSE TREATMENT: ICD-10-PCS | Performed by: ALLERGY & IMMUNOLOGY

## 2020-11-19 RX ADMIN — ALBUTEROL SULFATE SCH PUFF: 90 AEROSOL, METERED RESPIRATORY (INHALATION) at 11:55

## 2020-11-19 RX ADMIN — NICOTINE SCH: 21 PATCH TRANSDERMAL at 14:51

## 2020-11-19 RX ADMIN — Medication SCH TAB: at 11:55

## 2020-11-19 RX ADMIN — GABAPENTIN SCH MG: 300 CAPSULE ORAL at 11:55

## 2020-11-19 RX ADMIN — ALBUTEROL SULFATE SCH: 90 AEROSOL, METERED RESPIRATORY (INHALATION) at 22:16

## 2020-11-19 RX ADMIN — METFORMIN HYDROCHLORIDE SCH MG: 500 TABLET ORAL at 11:55

## 2020-11-19 RX ADMIN — GABAPENTIN SCH MG: 300 CAPSULE ORAL at 22:16

## 2020-11-19 RX ADMIN — ALBUTEROL SULFATE SCH: 90 AEROSOL, METERED RESPIRATORY (INHALATION) at 16:35

## 2020-11-19 RX ADMIN — HYDROXYZINE PAMOATE SCH: 25 CAPSULE ORAL at 14:51

## 2020-11-19 RX ADMIN — HYDROXYZINE PAMOATE SCH: 25 CAPSULE ORAL at 18:04

## 2020-11-19 RX ADMIN — Medication SCH MG: at 22:16

## 2020-11-19 RX ADMIN — HYDROXYZINE PAMOATE SCH MG: 25 CAPSULE ORAL at 22:16

## 2020-11-19 RX ADMIN — HYDROXYZINE PAMOATE SCH MG: 25 CAPSULE ORAL at 11:54

## 2020-11-19 SDOH — ECONOMIC STABILITY - HOUSING INSECURITY: HOMELESSNESS: Z59.0

## 2020-11-19 SDOH — ECONOMIC STABILITY - INCOME SECURITY: UNEMPLOYMENT, UNSPECIFIED: Z56.0

## 2020-11-20 RX ADMIN — HYDROXYZINE PAMOATE SCH: 25 CAPSULE ORAL at 13:23

## 2020-11-20 RX ADMIN — GABAPENTIN SCH MG: 300 CAPSULE ORAL at 10:09

## 2020-11-20 RX ADMIN — METFORMIN HYDROCHLORIDE SCH MG: 500 TABLET ORAL at 10:12

## 2020-11-20 RX ADMIN — HYDROXYZINE PAMOATE SCH MG: 25 CAPSULE ORAL at 05:13

## 2020-11-20 RX ADMIN — HYDROXYZINE PAMOATE SCH MG: 25 CAPSULE ORAL at 17:46

## 2020-11-20 RX ADMIN — Medication SCH MG: at 22:14

## 2020-11-20 RX ADMIN — ALBUTEROL SULFATE SCH: 90 AEROSOL, METERED RESPIRATORY (INHALATION) at 22:14

## 2020-11-20 RX ADMIN — NICOTINE SCH: 21 PATCH TRANSDERMAL at 10:12

## 2020-11-20 RX ADMIN — ALBUTEROL SULFATE SCH: 90 AEROSOL, METERED RESPIRATORY (INHALATION) at 15:12

## 2020-11-20 RX ADMIN — ALBUTEROL SULFATE SCH: 90 AEROSOL, METERED RESPIRATORY (INHALATION) at 05:16

## 2020-11-20 RX ADMIN — Medication SCH TAB: at 10:09

## 2020-11-20 RX ADMIN — ALBUTEROL SULFATE SCH: 90 AEROSOL, METERED RESPIRATORY (INHALATION) at 10:12

## 2020-11-20 RX ADMIN — GABAPENTIN SCH MG: 300 CAPSULE ORAL at 22:14

## 2020-11-20 RX ADMIN — HYDROXYZINE PAMOATE SCH: 25 CAPSULE ORAL at 10:13

## 2020-11-20 RX ADMIN — HYDROXYZINE PAMOATE SCH MG: 25 CAPSULE ORAL at 22:14

## 2020-11-21 VITALS — SYSTOLIC BLOOD PRESSURE: 130 MMHG | HEART RATE: 60 BPM | TEMPERATURE: 97.3 F | DIASTOLIC BLOOD PRESSURE: 86 MMHG

## 2020-11-21 RX ADMIN — NICOTINE SCH MG: 21 PATCH TRANSDERMAL at 10:11

## 2020-11-21 RX ADMIN — ALBUTEROL SULFATE SCH PUFF: 90 AEROSOL, METERED RESPIRATORY (INHALATION) at 06:05

## 2020-11-21 RX ADMIN — HYDROXYZINE PAMOATE SCH: 25 CAPSULE ORAL at 10:45

## 2020-11-21 RX ADMIN — HYDROXYZINE PAMOATE SCH MG: 25 CAPSULE ORAL at 06:05

## 2020-11-21 RX ADMIN — Medication SCH TAB: at 10:11

## 2020-11-21 RX ADMIN — ALBUTEROL SULFATE SCH: 90 AEROSOL, METERED RESPIRATORY (INHALATION) at 15:16

## 2020-11-21 RX ADMIN — GABAPENTIN SCH MG: 300 CAPSULE ORAL at 10:11

## 2020-11-21 RX ADMIN — ALBUTEROL SULFATE SCH: 90 AEROSOL, METERED RESPIRATORY (INHALATION) at 10:11

## 2020-11-21 RX ADMIN — METFORMIN HYDROCHLORIDE SCH MG: 500 TABLET ORAL at 10:12

## 2020-11-21 RX ADMIN — HYDROXYZINE PAMOATE SCH: 25 CAPSULE ORAL at 13:54

## 2020-12-01 ENCOUNTER — EMERGENCY (EMERGENCY)
Facility: HOSPITAL | Age: 60
LOS: 1 days | Discharge: HOME | End: 2020-12-01
Attending: EMERGENCY MEDICINE | Admitting: EMERGENCY MEDICINE
Payer: MEDICAID

## 2020-12-01 VITALS
SYSTOLIC BLOOD PRESSURE: 131 MMHG | HEART RATE: 59 BPM | RESPIRATION RATE: 18 BRPM | OXYGEN SATURATION: 95 % | DIASTOLIC BLOOD PRESSURE: 74 MMHG | TEMPERATURE: 99 F | WEIGHT: 210.1 LBS

## 2020-12-01 DIAGNOSIS — F11.129 OPIOID ABUSE WITH INTOXICATION, UNSPECIFIED: ICD-10-CM

## 2020-12-01 DIAGNOSIS — F10.129 ALCOHOL ABUSE WITH INTOXICATION, UNSPECIFIED: ICD-10-CM

## 2020-12-01 DIAGNOSIS — R41.82 ALTERED MENTAL STATUS, UNSPECIFIED: ICD-10-CM

## 2020-12-01 PROCEDURE — 99284 EMERGENCY DEPT VISIT MOD MDM: CPT

## 2020-12-01 RX ORDER — NALOXONE HYDROCHLORIDE 4 MG/.1ML
0.4 SPRAY NASAL ONCE
Refills: 0 | Status: COMPLETED | OUTPATIENT
Start: 2020-12-01 | End: 2020-12-01

## 2020-12-01 RX ADMIN — NALOXONE HYDROCHLORIDE 0.4 MILLIGRAM(S): 4 SPRAY NASAL at 18:06

## 2020-12-01 NOTE — ED PROVIDER NOTE - OBJECTIVE STATEMENT
61 y/o male BIB EMS for AMS. Patient admits to drinking alcohol and snorting heroin. Responds to verbal stimuli. Unable to cooperate with remainder of history due to clinical condition/AMS.

## 2020-12-01 NOTE — ED PROVIDER NOTE - CLINICAL SUMMARY MEDICAL DECISION MAKING FREE TEXT BOX
Patient presenting with acute ETOH intoxication/heroin use/AMS. No trauma or injuries noted. History and ROS limited due to altered state.  No evidence of head or extremity trauma. Will observe for clinical sobriety.

## 2020-12-01 NOTE — ED PROVIDER NOTE - PROGRESS NOTE DETAILS
The patient is now awake and alert, clinically sober.  Able to walk a straight line.  Repeat exam and neuro/cranial nerve exams normal.  No evidence of head/neck trauma.  Patient denies any pain or other complaints.  Denies cp/sob/ha/abd pain.  Abd soft, lungs clear, heart exam normal.  Neri po challenge.  Patient says only used alcohol and heroin and no other substances.  Denies any assault.  Feels much better and pt feels safe for discharge.  No evidence of intoxication at this time or alcohol withdrawal.  No other complaints on discharge.

## 2020-12-01 NOTE — ED ADULT TRIAGE NOTE - CHIEF COMPLAINT QUOTE
Pt BIBEMS for AMS. PT admits to drinking alcohol and snorting heroine. Pt BIBEMS for AMS. PT admits to drinking alcohol and snorting heroine. Pt responsive to verbal stimuli.

## 2020-12-01 NOTE — ED PROVIDER NOTE - NSFOLLOWUPINSTRUCTIONS_ED_ALL_ED_FT
-PLEASE FOLLOW-UP WITH YOUR PRIMARY CARE DOCTOR IN 1-2 DAYS.  BRING ALL PAPERWORK FROM TODAY'S VISIT TO YOUR FOLLOW-UP VISIT.  YOU MAY ALSO CALL [phone] AND ASK FOR MS. SORAYA DUONG.  SHE CAN HELP YOU MAKE A FOLLOW-UP APPOINTMENT.  HER HOURS ARE 11AM-7PM MONDAY - FRIDAY.    -PLEASE RETURN TO THE ER IMMEDIATELY OR CALL 911 FOR ANY HIGH FEVER, TROUBLE BREATHING, VOMITING, SEVERE PAIN, OR ANY OTHER CONCERNS.

## 2020-12-01 NOTE — ED PROVIDER NOTE - PATIENT PORTAL LINK FT
You can access the FollowMyHealth Patient Portal offered by [facility] by registering at the following website: [email or web address]. By joining [employer]’s FollowMyHealth portal, you will also be able to view your health information using other applications (apps) compatible with our system.

## 2020-12-01 NOTE — ED ADULT NURSE NOTE - OBJECTIVE STATEMENT
Pt BIBA asfter ETOH and heroin use. SpO2 89% and bradypneic on arrival. No signs of injury.  Placed on monitor and IM Narcan 0.4mg administered on arrival

## 2020-12-01 NOTE — ED PROVIDER NOTE - PHYSICAL EXAMINATION
General: lethargic, arousable to touch, smells of alcohol  Head: NCAT  Eyes: Pin-point pupils bilaterally.   Heart: RRR  Lungs: CTAB  Abd: soft, NTND  Neuro: moves all 4 extremities equally  Skin: no e/o lacerations, abrasions, or ecchymoses

## 2020-12-01 NOTE — ED ADULT NURSE NOTE - CHIEF COMPLAINT QUOTE
Pt BIBEMS for AMS. PT admits to drinking alcohol and snorting heroine. Pt responsive to verbal stimuli.

## 2020-12-02 VITALS
TEMPERATURE: 99 F | RESPIRATION RATE: 18 BRPM | OXYGEN SATURATION: 97 % | HEART RATE: 63 BPM | DIASTOLIC BLOOD PRESSURE: 71 MMHG | SYSTOLIC BLOOD PRESSURE: 135 MMHG

## 2021-12-02 NOTE — ED ADULT NURSE NOTE - NSFALLRSKINDICATORS_ED_ALL_ED
MR LUMBAR SPINE WO [id number] 12/1/2021 3:30 PM



INDICATION: Lumbar radiculopathy. Low back pain and bilateral lower extremity weakness and pain



COMPARISON: CT lumbar spine 7/13/2020



TECHNIQUE:  Multiplanar, multisequence MR imaging of the lumbar spine was performed without gadoliniu
m based contrast.



FINDINGS:

There is levoconvex scoliosis of the lumbar spine with apex levocurvature at L3-L4. Moderate disc hei
ght loss asymmetric to the right at L3-L4 with Modic type I endplate degenerative changes asymmetric 
to the right. There is widening of the disc space at T11-T12 with minimal edema along the inferior en
dplate of T11 and superior endplate of T12. There is 75 percent height loss at T12, stable from 7/17/
2020. No significant fluid signal within the disc space. No prevertebral edema. Modic type I endplate
 degenerative changes are identified at L1-L2, L2-L3, L3-L4 and L5-S1. Conus medullaris terminates at
 L2. Distal spinal cord signal intensity is normal in all sequences. There is an inferior endplate Sc
hmorl's node at L2 with at least 25 percent height loss. Findings appear chronic. No new compression 
fracture. Mild disc height loss at L3-L4. Disc desiccation identified at all levels of the lumbar spi
ne. Abdominal aorta is normal in caliber. No suspicious retroperitoneal abnormality is identified. Bi
lateral renal cortical cysts are identified with associated cortical atrophy. Limited evaluation of t
he inferior pole left kidney.



T11-T12: There is a posterior disc osteophyte complex. Moderate right and mild left facet arthropathy
. Severe left and moderate right neuroforaminal stenosis. Mild spinal canal stenosis.



T12-L1: Disc is normal in configuration. Mild facet arthropathy. No neuroforaminal or spinal canal st
enosis.



L1-L2: Disc is normal in configuration. Mild facet arthropathy. No neuroforaminal or spinal canal gwendolyn
nosis.



L2-L3: There is a circumferential disc bulge. Mild facet arthropathy. Mild bilateral neuroforaminal s
tenosis. Mild spinal canal stenosis.



L3-L4: There is mild disc bulge. Moderate right and mild left facet arthropathy. Moderate right and m
ild left neuroforaminal stenosis. Mild spinal canal stenosis.



L4-L5: There is a circumferential disc bulge. Moderate facet arthropathy. Moderate right and mild lef
t foraminal stenosis. Mild spinal canal stenosis.



L5-S1: There is a circumferential disc bulge asymmetric to the left. There is severe left and mild ri
ght neuroforaminal stenosis. Mild spinal canal stenosis.



IMPRESSION:

1. Moderate to advanced lumbar spondylosis as described in detail above.

2. Sequela of prior discitis osteomyelitis identified at T11-T12 with minimal persistent edema along 
the endplates. No fluid signal within the disc space. No findings to suggest prevertebral edema or ph
legmon.

3. There is low T1 signal involving the marrow is nonspecific, although may be associated with red ma
rrow hyperplasia in the setting of obesity, chronic anemia or smoking. Correlate with any underlying 
lymphoproliferative disorder.



 



Electronically signed by: Jaci Boston MD (12/2/2021 8:27 AM) [id number]
yes

## 2022-11-09 NOTE — HP
COWS





- Scale


Resting Pulse: 0= IN 80 or Below


Sweatin=Flushed/Facial Moisture


Restless Observation: 1= Difficult to Sit Still


Pupil Size: 0= Normal to Room Light


Bone or Joint Aches: 2= Severe Diffuse Aches


Runny Nose/ Eye Tearin= Runny Nose/Eyes


GI Upset > 30mins: 1= Stomach Cramp


Tremor Observation: 2= Slight Tremor Visible


Yawning Observation: 0= None


Anxiety or Irritability: 2=Irritable/Anxious


Goose Flesh Skin: 0=Smooth Skin


COWS Score: 12





CIWA Score


Nausea/Vomitin-Mild Nausea/No Vomiting


Muscle Tremors: 4-Moderate,w/Arms Extend


Anxiety: 3


Agitation: 4-Moderately Restless


Paroxysmal Sweats: 2


Orientation: 0-Oriented


Tacttile Disturbances: 0-None


Auditory Disturbances: 0-None


Visual Disturbances: 0-None


Headache: 0-None Present


CIWA-Ar Total Score: 14





- Admission Criteria


[facility] Guidelines: Admission for Medically Managed Detox: 


Requires at least one of the followin. CIWA greater than 12


2. Seizures within the past 24 hours


3. Delirium tremens within the past 24 hours


4. Hallucinations within the past 24 hours


5. Acute intervention needed for co  occurring medical disorder


6. Acute intervention needed for co  occurring psychiatric disorder


7. Severe withdrawal that cannot be handled at a lower level of care (continued


    vomiting, continued diarrhea, abnormal vital signs) requiring intravenous


    medication and/or fluids


8. Pregnancy








Admitting History and Physical





- Smoking History


Smoking history: Current every day smoker


Have you smoked in the past 12 months: Yes


Aproximately how many cigarettes per day: 20





- Alcohol/Substance Use


Hx Alcohol Use: Yes





Admission [facility]





- [facility]


Chief Complaint: 





detox heroin


Allergies/Adverse Reactions: 


 Allergies











Allergy/AdvReac Type Severity Reaction Status Date / Time


 


No Known Allergies Allergy   Verified 10/29/19 12:15











History of Present Illness: 





59 year old male with a medical history of partially DM, hepatitis C (untreated)

, heroin and alcohol dependence here for detox. Never been in a methadone 

program. Here in July for detox but left early because according to the patient 

"I shit myself and had to leave". Does not know whether he wants to stay for 

rehab.





Heroin: last used 11pm last night, 7-10 bags per day, using heroin all his life

; sniffs it, never injected; has ODd in the past (3-4 times); has not had a 

narcan





Alcohol: 6 nips of vodka per day/ 6 beers per day; never had a long period of 

sobriety; never had a seizure





Marijuana: every day couple of drags, does not do K2





PCP: occasionally, last time 2 days ago





Denies benzo use, no oxy/percocets.








Surgery: hernia operation 


Living Situation: lives with cousin


Family: family is supportive of him


Work: off-book work








- Ebola screening


Have you traveled outside of the country in the last 21 days: No


Have you had contact with anyone from an Ebola affected area: No


Do you have a fever: No





- Review of Systems


Constitutional: Chills, Night Sweats


EENT: reports: No Symptoms Reported


Respiratory: reports: No Symptoms reported


Cardiac: reports: No Symptoms Reported


GI: reports: No Symptoms Reported


: reports: No Symptoms Reported


Musculoskeletal: reports: Back Pain, Joint Pain (R ankle pain)


Integumentary: reports: No Symptoms Reported


Neuro: reports: No Symptoms reported


Hematology: reports: No Symptoms Reported


Psychiatric: reports: Judgement Intact, Mood/Affect Appropiate, Orientated x3, 

Agitated, Anxious, Depressed





Patient History





- Patient Medical History


Hx Anemia: No


Hx Asthma: No


Hx Chronic Obstructive Pulmonary Disease (COPD): No


Hx Cancer: No


Hx Cardiac Disorders: No


Hx Congestive Heart Failure: No


Hx Hypertension: No


Hx Hypercholesterolemia: No


Hx Pacemaker: No


HX Cerebrovascular Accident: No


Hx Seizures: No


Hx Dementia: No


Hx Diabetes: Yes (pre diabetes)


Hx Gastrointestinal Disorders: No


Hx Liver Disease: No


Hx Genitourinary Disorders: No


Hx Sexually Transmitted Disorders: No


Hx Renal Disease (ESRD): No


Hx Thyroid Disease: No


Hx Human Immunodeficiency Virus (HIV): No (negative last )


Hx Hepatitis C: Yes (since  - never treated)


Hx Depression: Yes (no meds)


Hx Suicide Attempt: No (denies (everday I use drugs it's like suicide))


Hx Bipolar Disorder: No


Hx Schizophrenia: No





- Patient Surgical History


Past Surgical History: Yes


Hx Neurologic Surgery: No


Hx Cataract Extraction: No


Hx Cardiac Surgery: No


Hx Lung Surgery: No


Hx Breast Surgery: No


Hx Breast Biopsy: No


Hx Abdominal Surgery: Yes (right inguinal hernia in )


Hx Appendectomy: No


Hx Cholecystectomy: No


Hx Genitourinary Surgery: No


Hx  Section: No


Hx Orthopedic Surgery: No


Other Surgical History: right inguinal hernia repaqir in 


Anesthesia Reaction: No





- PPD History


Date: 09/10/18


Results: 0 mm





- Smoking Cessation


Smoking history: Current every day smoker


Have you smoked in the past 12 months: Yes


Aproximately how many cigarettes per day: 20


Hx Chewing Tobacco Use: No


Initiated information on smoking cessation: Yes


'Breaking Loose' booklet given: 10/29/19





- Substances abused


  ** Alcohol


Substance route: Oral


Frequency: Daily


Amount used: 1.5 pint of Vodka, 6 of 16 oz beer


Age of first use: 14


Date of last use: 10/29/19





  ** Heroin


Substance route: Inhalation


Frequency: Daily


Amount used: 7-8 bags/daily


Age of first use: 14


Date of last use: 10/29/19





Admission Physical Exam [facility]





- Vital Signs


Vital Signs: 


 Vital Signs - 24 hr











  10/29/19





  12:17


 


Pulse Rate 60


 


Respiratory 16





Rate 


 


Blood Pressure 118/80














- Physical


General Appearance: Yes: No Apparent Distress, Disheveled, Irritable


HEENTM: Yes: EOMI, Hearing grossly Normal, Normal ENT Inspection


Respiratory: Yes: Chest Non-Tender, Lungs Clear


Neck: Yes: No masses,lesions,Nodules


Breast: Yes: Within Normal Limits


Cardiology: Yes: Regular Rhythm, Regular Rate


Abdominal: Yes: Normal Bowel Sounds, Non Tender, Flat, Soft


Extremities: Yes: Swelling (R ankle with tenderness)


Neurological: Yes: CNs II-XII NML intact, Fully Oriented, Alert, Motor Strength 

5/5, Normal Mood/Affect, Normal Response


Integumentary: Yes: Normal Color, Dry, Warm





Cleared for Admission [facility]





- Detox or Rehab


[facility] Level of Care: Medically Managed





Breathalyzer





- Breathalyzer


Breathalyzer: 0.041





Urine Drug Screen





- Test Device


Lot number: [id number]


Expiration date: 21





- Control


Is test valid?: Yes





- Results


Drug screen NEGATIVE: No


Urine drug screen results: THC-Marijuana, FEN-Fentanyl, MOP-Opiates, BZO-

Benzodiazepines





Inpatient Rehab Admission





- Rehab Decision to Admit


Inpatient rehab admission?: No See treatment section for updated plan of care.    Bianca Almodovar PT, DPT   11/10/2022

## 2024-06-06 NOTE — ED ADULT TRIAGE NOTE - ESI TRIAGE ACUITY LEVEL, MLM
Detail Level: Detailed
Quality 130: Documentation Of Current Medications In The Medical Record: Current Medications Documented
2
Quality 47: Advance Care Plan: Advance Care Planning discussed and documented in the medical record; patient did not wish or was not able to name a surrogate decision maker or provide an advance care plan.